# Patient Record
Sex: FEMALE | Race: OTHER | HISPANIC OR LATINO | URBAN - METROPOLITAN AREA
[De-identification: names, ages, dates, MRNs, and addresses within clinical notes are randomized per-mention and may not be internally consistent; named-entity substitution may affect disease eponyms.]

---

## 2017-03-13 ENCOUNTER — INPATIENT (INPATIENT)
Facility: HOSPITAL | Age: 77
LOS: 2 days | Discharge: HOME | DRG: 871 | End: 2017-03-16
Attending: INTERNAL MEDICINE | Admitting: INTERNAL MEDICINE
Payer: MEDICAID

## 2017-03-13 VITALS
OXYGEN SATURATION: 98 % | DIASTOLIC BLOOD PRESSURE: 47 MMHG | HEART RATE: 89 BPM | SYSTOLIC BLOOD PRESSURE: 67 MMHG | HEIGHT: 59 IN | WEIGHT: 100.09 LBS | RESPIRATION RATE: 16 BRPM | TEMPERATURE: 98 F

## 2017-03-13 DIAGNOSIS — R00.0 TACHYCARDIA, UNSPECIFIED: ICD-10-CM

## 2017-03-13 DIAGNOSIS — Z95.5 PRESENCE OF CORONARY ANGIOPLASTY IMPLANT AND GRAFT: Chronic | ICD-10-CM

## 2017-03-13 DIAGNOSIS — Z29.9 ENCOUNTER FOR PROPHYLACTIC MEASURES, UNSPECIFIED: ICD-10-CM

## 2017-03-13 DIAGNOSIS — A41.9 SEPSIS, UNSPECIFIED ORGANISM: ICD-10-CM

## 2017-03-13 LAB
ALBUMIN SERPL ELPH-MCNC: 3.6 G/DL — SIGNIFICANT CHANGE UP (ref 3.5–5)
ALP SERPL-CCNC: 110 U/L — SIGNIFICANT CHANGE UP (ref 40–120)
ALT FLD-CCNC: 22 U/L DA — SIGNIFICANT CHANGE UP (ref 10–60)
ANION GAP SERPL CALC-SCNC: 14 MMOL/L — SIGNIFICANT CHANGE UP (ref 5–17)
APPEARANCE UR: ABNORMAL
AST SERPL-CCNC: 30 U/L — SIGNIFICANT CHANGE UP (ref 10–40)
BASOPHILS # BLD AUTO: 0.1 K/UL — SIGNIFICANT CHANGE UP (ref 0–0.2)
BASOPHILS NFR BLD AUTO: 0.4 % — SIGNIFICANT CHANGE UP (ref 0–2)
BILIRUB SERPL-MCNC: 1.8 MG/DL — HIGH (ref 0.2–1.2)
BILIRUB UR-MCNC: NEGATIVE — SIGNIFICANT CHANGE UP
BUN SERPL-MCNC: 21 MG/DL — HIGH (ref 7–18)
CALCIUM SERPL-MCNC: 9.2 MG/DL — SIGNIFICANT CHANGE UP (ref 8.4–10.5)
CHLORIDE SERPL-SCNC: 102 MMOL/L — SIGNIFICANT CHANGE UP (ref 96–108)
CO2 SERPL-SCNC: 23 MMOL/L — SIGNIFICANT CHANGE UP (ref 22–31)
COLOR SPEC: YELLOW — SIGNIFICANT CHANGE UP
CREAT SERPL-MCNC: 1.39 MG/DL — HIGH (ref 0.5–1.3)
DIFF PNL FLD: ABNORMAL
EOSINOPHIL # BLD AUTO: 0 K/UL — SIGNIFICANT CHANGE UP (ref 0–0.5)
EOSINOPHIL NFR BLD AUTO: 0 % — SIGNIFICANT CHANGE UP (ref 0–6)
GLUCOSE SERPL-MCNC: 98 MG/DL — SIGNIFICANT CHANGE UP (ref 70–99)
GLUCOSE UR QL: NEGATIVE — SIGNIFICANT CHANGE UP
HCT VFR BLD CALC: 44.5 % — SIGNIFICANT CHANGE UP (ref 34.5–45)
HGB BLD-MCNC: 14.7 G/DL — SIGNIFICANT CHANGE UP (ref 11.5–15.5)
KETONES UR-MCNC: ABNORMAL
LACTATE SERPL-SCNC: 1.8 MMOL/L — SIGNIFICANT CHANGE UP (ref 0.7–2)
LEUKOCYTE ESTERASE UR-ACNC: ABNORMAL
LYMPHOCYTES # BLD AUTO: 0.9 K/UL — LOW (ref 1–3.3)
LYMPHOCYTES # BLD AUTO: 5.1 % — LOW (ref 13–44)
MCHC RBC-ENTMCNC: 31.1 PG — SIGNIFICANT CHANGE UP (ref 27–34)
MCHC RBC-ENTMCNC: 33.1 GM/DL — SIGNIFICANT CHANGE UP (ref 32–36)
MCV RBC AUTO: 94 FL — SIGNIFICANT CHANGE UP (ref 80–100)
MONOCYTES # BLD AUTO: 1.2 K/UL — HIGH (ref 0–0.9)
MONOCYTES NFR BLD AUTO: 6.9 % — SIGNIFICANT CHANGE UP (ref 2–14)
NEUTROPHILS # BLD AUTO: 15.2 K/UL — HIGH (ref 1.8–7.4)
NEUTROPHILS NFR BLD AUTO: 87.6 % — HIGH (ref 43–77)
NITRITE UR-MCNC: NEGATIVE — SIGNIFICANT CHANGE UP
PH UR: 7 — SIGNIFICANT CHANGE UP (ref 4.8–8)
PLATELET # BLD AUTO: 236 K/UL — SIGNIFICANT CHANGE UP (ref 150–400)
POTASSIUM SERPL-MCNC: 3.9 MMOL/L — SIGNIFICANT CHANGE UP (ref 3.5–5.3)
POTASSIUM SERPL-SCNC: 3.9 MMOL/L — SIGNIFICANT CHANGE UP (ref 3.5–5.3)
PROT SERPL-MCNC: 8.1 G/DL — SIGNIFICANT CHANGE UP (ref 6–8.3)
PROT UR-MCNC: 100
RBC # BLD: 4.73 M/UL — SIGNIFICANT CHANGE UP (ref 3.8–5.2)
RBC # FLD: 12 % — SIGNIFICANT CHANGE UP (ref 10.3–14.5)
SODIUM SERPL-SCNC: 139 MMOL/L — SIGNIFICANT CHANGE UP (ref 135–145)
SP GR SPEC: 1 — LOW (ref 1.01–1.02)
UROBILINOGEN FLD QL: NEGATIVE — SIGNIFICANT CHANGE UP
WBC # BLD: 17.4 K/UL — HIGH (ref 3.8–10.5)
WBC # FLD AUTO: 17.4 K/UL — HIGH (ref 3.8–10.5)

## 2017-03-13 PROCEDURE — 71010: CPT | Mod: 26

## 2017-03-13 PROCEDURE — 99285 EMERGENCY DEPT VISIT HI MDM: CPT

## 2017-03-13 RX ORDER — SODIUM CHLORIDE 9 MG/ML
1000 INJECTION INTRAMUSCULAR; INTRAVENOUS; SUBCUTANEOUS
Qty: 0 | Refills: 0 | Status: DISCONTINUED | OUTPATIENT
Start: 2017-03-13 | End: 2017-03-15

## 2017-03-13 RX ORDER — SODIUM CHLORIDE 9 MG/ML
3 INJECTION INTRAMUSCULAR; INTRAVENOUS; SUBCUTANEOUS ONCE
Qty: 0 | Refills: 0 | Status: COMPLETED | OUTPATIENT
Start: 2017-03-13 | End: 2017-03-13

## 2017-03-13 RX ORDER — ASPIRIN/CALCIUM CARB/MAGNESIUM 324 MG
81 TABLET ORAL DAILY
Qty: 0 | Refills: 0 | Status: DISCONTINUED | OUTPATIENT
Start: 2017-03-13 | End: 2017-03-16

## 2017-03-13 RX ORDER — SODIUM CHLORIDE 9 MG/ML
500 INJECTION INTRAMUSCULAR; INTRAVENOUS; SUBCUTANEOUS ONCE
Qty: 0 | Refills: 0 | Status: COMPLETED | OUTPATIENT
Start: 2017-03-13 | End: 2017-03-13

## 2017-03-13 RX ORDER — VANCOMYCIN HCL 1 G
1000 VIAL (EA) INTRAVENOUS ONCE
Qty: 0 | Refills: 0 | Status: COMPLETED | OUTPATIENT
Start: 2017-03-13 | End: 2017-03-13

## 2017-03-13 RX ORDER — DIPHENHYDRAMINE HCL 50 MG
25 CAPSULE ORAL ONCE
Qty: 0 | Refills: 0 | Status: COMPLETED | OUTPATIENT
Start: 2017-03-13 | End: 2017-03-13

## 2017-03-13 RX ORDER — ACETAMINOPHEN 500 MG
650 TABLET ORAL EVERY 6 HOURS
Qty: 0 | Refills: 0 | Status: DISCONTINUED | OUTPATIENT
Start: 2017-03-13 | End: 2017-03-16

## 2017-03-13 RX ORDER — AZITHROMYCIN 500 MG/1
500 TABLET, FILM COATED ORAL ONCE
Qty: 0 | Refills: 0 | Status: COMPLETED | OUTPATIENT
Start: 2017-03-13 | End: 2017-03-13

## 2017-03-13 RX ORDER — SODIUM CHLORIDE 9 MG/ML
1000 INJECTION INTRAMUSCULAR; INTRAVENOUS; SUBCUTANEOUS ONCE
Qty: 0 | Refills: 0 | Status: COMPLETED | OUTPATIENT
Start: 2017-03-13 | End: 2017-03-13

## 2017-03-13 RX ORDER — ENOXAPARIN SODIUM 100 MG/ML
30 INJECTION SUBCUTANEOUS DAILY
Qty: 0 | Refills: 0 | Status: DISCONTINUED | OUTPATIENT
Start: 2017-03-13 | End: 2017-03-16

## 2017-03-13 RX ORDER — SODIUM CHLORIDE 9 MG/ML
500 INJECTION INTRAMUSCULAR; INTRAVENOUS; SUBCUTANEOUS
Qty: 0 | Refills: 0 | Status: COMPLETED | OUTPATIENT
Start: 2017-03-13 | End: 2017-03-13

## 2017-03-13 RX ORDER — CEFTRIAXONE 500 MG/1
1 INJECTION, POWDER, FOR SOLUTION INTRAMUSCULAR; INTRAVENOUS EVERY 24 HOURS
Qty: 0 | Refills: 0 | Status: DISCONTINUED | OUTPATIENT
Start: 2017-03-13 | End: 2017-03-16

## 2017-03-13 RX ORDER — CEFEPIME 1 G/1
1000 INJECTION, POWDER, FOR SOLUTION INTRAMUSCULAR; INTRAVENOUS ONCE
Qty: 0 | Refills: 0 | Status: COMPLETED | OUTPATIENT
Start: 2017-03-13 | End: 2017-03-13

## 2017-03-13 RX ORDER — ENOXAPARIN SODIUM 100 MG/ML
40 INJECTION SUBCUTANEOUS DAILY
Qty: 0 | Refills: 0 | Status: DISCONTINUED | OUTPATIENT
Start: 2017-03-13 | End: 2017-03-13

## 2017-03-13 RX ORDER — AZITHROMYCIN 500 MG/1
TABLET, FILM COATED ORAL
Qty: 0 | Refills: 0 | Status: DISCONTINUED | OUTPATIENT
Start: 2017-03-13 | End: 2017-03-16

## 2017-03-13 RX ORDER — AZITHROMYCIN 500 MG/1
500 TABLET, FILM COATED ORAL EVERY 24 HOURS
Qty: 0 | Refills: 0 | Status: DISCONTINUED | OUTPATIENT
Start: 2017-03-14 | End: 2017-03-16

## 2017-03-13 RX ADMIN — SODIUM CHLORIDE 2000 MILLILITER(S): 9 INJECTION INTRAMUSCULAR; INTRAVENOUS; SUBCUTANEOUS at 14:36

## 2017-03-13 RX ADMIN — SODIUM CHLORIDE 2000 MILLILITER(S): 9 INJECTION INTRAMUSCULAR; INTRAVENOUS; SUBCUTANEOUS at 14:10

## 2017-03-13 RX ADMIN — SODIUM CHLORIDE 1000 MILLILITER(S): 9 INJECTION INTRAMUSCULAR; INTRAVENOUS; SUBCUTANEOUS at 20:07

## 2017-03-13 RX ADMIN — Medication 25 MILLIGRAM(S): at 15:02

## 2017-03-13 RX ADMIN — CEFTRIAXONE 100 GRAM(S): 500 INJECTION, POWDER, FOR SOLUTION INTRAMUSCULAR; INTRAVENOUS at 16:18

## 2017-03-13 RX ADMIN — Medication 250 MILLIGRAM(S): at 14:37

## 2017-03-13 RX ADMIN — SODIUM CHLORIDE 75 MILLILITER(S): 9 INJECTION INTRAMUSCULAR; INTRAVENOUS; SUBCUTANEOUS at 20:44

## 2017-03-13 RX ADMIN — SODIUM CHLORIDE 75 MILLILITER(S): 9 INJECTION INTRAMUSCULAR; INTRAVENOUS; SUBCUTANEOUS at 16:18

## 2017-03-13 RX ADMIN — CEFEPIME 100 MILLIGRAM(S): 1 INJECTION, POWDER, FOR SOLUTION INTRAMUSCULAR; INTRAVENOUS at 14:00

## 2017-03-13 RX ADMIN — AZITHROMYCIN 250 MILLIGRAM(S): 500 TABLET, FILM COATED ORAL at 16:36

## 2017-03-13 RX ADMIN — SODIUM CHLORIDE 3 MILLILITER(S): 9 INJECTION INTRAMUSCULAR; INTRAVENOUS; SUBCUTANEOUS at 13:24

## 2017-03-13 RX ADMIN — Medication 650 MILLIGRAM(S): at 17:40

## 2017-03-13 RX ADMIN — SODIUM CHLORIDE 2000 MILLILITER(S): 9 INJECTION INTRAMUSCULAR; INTRAVENOUS; SUBCUTANEOUS at 15:17

## 2017-03-13 RX ADMIN — SODIUM CHLORIDE 2000 MILLILITER(S): 9 INJECTION INTRAMUSCULAR; INTRAVENOUS; SUBCUTANEOUS at 14:46

## 2017-03-13 RX ADMIN — SODIUM CHLORIDE 1000 MILLILITER(S): 9 INJECTION INTRAMUSCULAR; INTRAVENOUS; SUBCUTANEOUS at 13:24

## 2017-03-13 NOTE — ED PROVIDER NOTE - CRITICAL CARE PROVIDED
direct patient care (not related to procedure)/additional history taking/interpretation of diagnostic studies/documentation/consult w/ pt's family directly relating to pts condition

## 2017-03-13 NOTE — ED PROVIDER NOTE - OBJECTIVE STATEMENT
Pt's daughter as . 77 y/o F pt with PMHx of Tachycardia and CAD and PSHx of cholecystectomy and Coronary Stents (x2) presents to ED c/o fever x5 days. Daughter states pt had tachycardia and hypotension this morning, although it was not measured; pt's heart function is generally low. Daughter also states pt has abd pain, urinary frequency, and urinary incontinence. Pt denies hematuria, or any other complaints. NKDA.

## 2017-03-13 NOTE — ED PROVIDER NOTE - NS ED MD SCRIBE ATTENDING SCRIBE SECTIONS
REVIEW OF SYSTEMS/HISTORY OF PRESENT ILLNESS/PHYSICAL EXAM/DISPOSITION/PAST MEDICAL/SURGICAL/SOCIAL HISTORY/VITAL SIGNS( Pullset)

## 2017-03-13 NOTE — H&P ADULT. - HISTORY OF PRESENT ILLNESS
76 F from home, lives with daughter PMH of tachycardia (on cardizem ) , lung nodules presented to ED with c/o fever, chills, and urine incontinence. As per patient for last one week she has been having increased urinary frequency and unable to hold urine , denied any burning  but c.o feeling pain in lower abdomen and sensation of full bladder with pain. She also c/o having fever associated with chills for the past 3 days  , did not measure temperature. She had one episode of UTI 6 months ago but did not require hospitalization. c/o bilateral back pain yesterday.   Denied any headache, chills, chest pain, SOB, diarrhea, c/o constipation, last BM yesterday . Denied any history of heart failure or CAD not on aspirin or statin.

## 2017-03-13 NOTE — ED PROVIDER NOTE - MEDICAL DECISION MAKING DETAILS
75 y/o F pt c/o of fever, hypotension, tachycardia, urinary frequency, urinary incontinence and abd pain x5 days. Code sepsis activated. Plan for fluids, labs, and submission for urosepsis.

## 2017-03-13 NOTE — H&P ADULT. - PROBLEM SELECTOR PLAN 2
history of sinus tachycardia on cardizem   Will hold as blood pressure is borderline   Restart once BP stable

## 2017-03-13 NOTE — H&P ADULT. - PROBLEM SELECTOR PLAN 1
s/p 2 L bolus s/p 2 L bolus  UA grossly positive   Sepsis secondary to UTI   s/p vancomycin and maxipime by ED   Continue with rocephin   CXR showed Numerous tiny bilateral nodular opacities are unchanged from the prior   radiographs dated 7/16/2015.Small new nodular opacities in the left midlung, may be infectious,   Patient coughing at bedside   Will add azithromycin for CAP coverage   Will get CT chest   No CVA tenderness on exam  , no need of CT abdomen for now

## 2017-03-13 NOTE — H&P ADULT. - ASSESSMENT
76 F from home, lives with daughter PMH of tachycardia (on cardizem ) , lung nodules presented to ED with c/o fever, chills, and urine incontinence. As per patient for last one week she has been having increased urinary frequency and unable to hold urine , denied any burning  but c.o feeling pain in lower abdomen and sensation of full bladder with pain. She also c/o having fever associated with chills for the past 3 days  , did not measure temperature. She had one episode of UTI 6 months ago but did not require hospitalization. c/o bilateral back pain yesterday but no CVA tenderness.   On admission BP 67/47 with HR of 89, repeated /61 , Lactate of 1.8 , WBC of 17.4 , received 2 L bolus .

## 2017-03-13 NOTE — ED ADULT NURSE NOTE - OBJECTIVE STATEMENT
1 pair
Patient came to the ED alert and responsive for low BP. Patient appears weak but not lethargic.

## 2017-03-13 NOTE — ED ADULT TRIAGE NOTE - CHIEF COMPLAINT QUOTE
per daughter, patient has been having frequent urination and generalized weakness for a week, hypotensive at triage

## 2017-03-13 NOTE — H&P ADULT. - ATTENDING COMMENTS
76 F from home, lives with daughter PMH of tachycardia (on cardizem ) , lung nodules presented to ED with c/o fever, chills, and urine incontinence. As per patient for last one week she has been having increased urinary frequency and unable to hold urine , denied any burning  but c.o feeling pain in lower abdomen and sensation of full bladder with pain. She also c/o having fever associated with chills for the past 3 days  , did not measure temperature. She had one episode of UTI 6 months ago but did not require hospitalization. c/o bilateral back pain yesterday.   Denied any headache, chills, chest pain, SOB, diarrhea, c/o constipation, last BM yesterday . Denied any history of heart failure or CAD not on aspirin or statin.    pt seen in oob in chair, NAD, vitals stable except for fever max temp 102 and hypotension in the ED, physical exam reveals lungs clear, heart s1s2, abd soft nt nd bs+, ext no edema. labs and diagnostic test result reviewed.    assessment -- sepsis likely 2nd to UTI, lung nodules, r/o pna r/o malignancy h/o tachycardia, lung nodules    plan  --  admit to medicine, start azithromax and rocephin, cont preadmit home meds, gi and dvt profilaxis,  cbc, bmp, mg, phos, lipids, tsh, bld cx, ua, ucx,    pulm cons

## 2017-03-14 LAB
AFP-TM SERPL-MCNC: 2.1 NG/ML — SIGNIFICANT CHANGE UP
ALBUMIN SERPL ELPH-MCNC: 2.6 G/DL — LOW (ref 3.5–5)
ALP SERPL-CCNC: 119 U/L — SIGNIFICANT CHANGE UP (ref 40–120)
ALT FLD-CCNC: 54 U/L DA — SIGNIFICANT CHANGE UP (ref 10–60)
ANION GAP SERPL CALC-SCNC: 13 MMOL/L — SIGNIFICANT CHANGE UP (ref 5–17)
AST SERPL-CCNC: 63 U/L — HIGH (ref 10–40)
BCA 255 TISS QL IMSTN: 15.5 U/ML — SIGNIFICANT CHANGE UP
BILIRUB SERPL-MCNC: 1 MG/DL — SIGNIFICANT CHANGE UP (ref 0.2–1.2)
BUN SERPL-MCNC: 13 MG/DL — SIGNIFICANT CHANGE UP (ref 7–18)
CALCIUM SERPL-MCNC: 8.1 MG/DL — LOW (ref 8.4–10.5)
CANCER AG125 SERPL-ACNC: 15 U/ML — SIGNIFICANT CHANGE UP
CEA SERPL-MCNC: 1 NG/ML — SIGNIFICANT CHANGE UP (ref 0–3.8)
CHLORIDE SERPL-SCNC: 108 MMOL/L — SIGNIFICANT CHANGE UP (ref 96–108)
CHOLEST SERPL-MCNC: 96 MG/DL — SIGNIFICANT CHANGE UP (ref 10–199)
CO2 SERPL-SCNC: 21 MMOL/L — LOW (ref 22–31)
CREAT SERPL-MCNC: 0.69 MG/DL — SIGNIFICANT CHANGE UP (ref 0.5–1.3)
GLUCOSE SERPL-MCNC: 73 MG/DL — SIGNIFICANT CHANGE UP (ref 70–99)
HBA1C BLD-MCNC: 5.5 % — SIGNIFICANT CHANGE UP (ref 4–5.6)
HCT VFR BLD CALC: 35.8 % — SIGNIFICANT CHANGE UP (ref 34.5–45)
HDLC SERPL-MCNC: 65 MG/DL — SIGNIFICANT CHANGE UP (ref 40–125)
HGB BLD-MCNC: 11.8 G/DL — SIGNIFICANT CHANGE UP (ref 11.5–15.5)
LACTATE SERPL-SCNC: 0.8 MMOL/L — SIGNIFICANT CHANGE UP (ref 0.7–2)
LIPID PNL WITH DIRECT LDL SERPL: 21 MG/DL — SIGNIFICANT CHANGE UP
MAGNESIUM SERPL-MCNC: 1.7 MG/DL — LOW (ref 1.8–2.4)
MCHC RBC-ENTMCNC: 30.8 PG — SIGNIFICANT CHANGE UP (ref 27–34)
MCHC RBC-ENTMCNC: 32.8 GM/DL — SIGNIFICANT CHANGE UP (ref 32–36)
MCV RBC AUTO: 93.8 FL — SIGNIFICANT CHANGE UP (ref 80–100)
PHOSPHATE SERPL-MCNC: 2.3 MG/DL — LOW (ref 2.5–4.5)
PLATELET # BLD AUTO: 184 K/UL — SIGNIFICANT CHANGE UP (ref 150–400)
POTASSIUM SERPL-MCNC: 3.5 MMOL/L — SIGNIFICANT CHANGE UP (ref 3.5–5.3)
POTASSIUM SERPL-SCNC: 3.5 MMOL/L — SIGNIFICANT CHANGE UP (ref 3.5–5.3)
PROT SERPL-MCNC: 6.3 G/DL — SIGNIFICANT CHANGE UP (ref 6–8.3)
RBC # BLD: 3.82 M/UL — SIGNIFICANT CHANGE UP (ref 3.8–5.2)
RBC # FLD: 12.3 % — SIGNIFICANT CHANGE UP (ref 10.3–14.5)
SODIUM SERPL-SCNC: 142 MMOL/L — SIGNIFICANT CHANGE UP (ref 135–145)
TOTAL CHOLESTEROL/HDL RATIO MEASUREMENT: 1.5 RATIO — LOW (ref 3.3–7.1)
TRIGL SERPL-MCNC: 50 MG/DL — SIGNIFICANT CHANGE UP (ref 10–149)
VIT B12 SERPL-MCNC: 555 PG/ML — SIGNIFICANT CHANGE UP (ref 243–894)
WBC # BLD: 13.3 K/UL — HIGH (ref 3.8–10.5)
WBC # FLD AUTO: 13.3 K/UL — HIGH (ref 3.8–10.5)

## 2017-03-14 PROCEDURE — 71250 CT THORAX DX C-: CPT | Mod: 26

## 2017-03-14 RX ADMIN — CEFTRIAXONE 100 GRAM(S): 500 INJECTION, POWDER, FOR SOLUTION INTRAMUSCULAR; INTRAVENOUS at 10:38

## 2017-03-14 RX ADMIN — Medication 650 MILLIGRAM(S): at 04:52

## 2017-03-14 RX ADMIN — Medication 81 MILLIGRAM(S): at 12:56

## 2017-03-14 RX ADMIN — AZITHROMYCIN 250 MILLIGRAM(S): 500 TABLET, FILM COATED ORAL at 10:38

## 2017-03-14 RX ADMIN — Medication 650 MILLIGRAM(S): at 20:41

## 2017-03-14 RX ADMIN — ENOXAPARIN SODIUM 30 MILLIGRAM(S): 100 INJECTION SUBCUTANEOUS at 12:56

## 2017-03-15 LAB
-  AMIKACIN: SIGNIFICANT CHANGE UP
-  AMPICILLIN/SULBACTAM: SIGNIFICANT CHANGE UP
-  AMPICILLIN: SIGNIFICANT CHANGE UP
-  AZTREONAM: SIGNIFICANT CHANGE UP
-  CEFAZOLIN: SIGNIFICANT CHANGE UP
-  CEFEPIME: SIGNIFICANT CHANGE UP
-  CEFOXITIN: SIGNIFICANT CHANGE UP
-  CEFTAZIDIME: SIGNIFICANT CHANGE UP
-  CEFTRIAXONE: SIGNIFICANT CHANGE UP
-  CIPROFLOXACIN: SIGNIFICANT CHANGE UP
-  ERTAPENEM: SIGNIFICANT CHANGE UP
-  GENTAMICIN: SIGNIFICANT CHANGE UP
-  IMIPENEM: SIGNIFICANT CHANGE UP
-  LEVOFLOXACIN: SIGNIFICANT CHANGE UP
-  MEROPENEM: SIGNIFICANT CHANGE UP
-  NITROFURANTOIN: SIGNIFICANT CHANGE UP
-  PIPERACILLIN/TAZOBACTAM: SIGNIFICANT CHANGE UP
-  TOBRAMYCIN: SIGNIFICANT CHANGE UP
-  TRIMETHOPRIM/SULFAMETHOXAZOLE: SIGNIFICANT CHANGE UP
ANION GAP SERPL CALC-SCNC: 12 MMOL/L — SIGNIFICANT CHANGE UP (ref 5–17)
BUN SERPL-MCNC: 8 MG/DL — SIGNIFICANT CHANGE UP (ref 7–18)
CALCIUM SERPL-MCNC: 8.8 MG/DL — SIGNIFICANT CHANGE UP (ref 8.4–10.5)
CANCER AG19-9 SERPL-ACNC: 27.9 U/ML — SIGNIFICANT CHANGE UP
CANCER AG27-29 SERPL-ACNC: 18.3 U/ML — SIGNIFICANT CHANGE UP (ref 0–37.7)
CHLORIDE SERPL-SCNC: 103 MMOL/L — SIGNIFICANT CHANGE UP (ref 96–108)
CO2 SERPL-SCNC: 26 MMOL/L — SIGNIFICANT CHANGE UP (ref 22–31)
CREAT SERPL-MCNC: 0.78 MG/DL — SIGNIFICANT CHANGE UP (ref 0.5–1.3)
CULTURE RESULTS: SIGNIFICANT CHANGE UP
GLUCOSE SERPL-MCNC: 89 MG/DL — SIGNIFICANT CHANGE UP (ref 70–99)
HCT VFR BLD CALC: 39.9 % — SIGNIFICANT CHANGE UP (ref 34.5–45)
HGB BLD-MCNC: 13 G/DL — SIGNIFICANT CHANGE UP (ref 11.5–15.5)
MCHC RBC-ENTMCNC: 29.9 PG — SIGNIFICANT CHANGE UP (ref 27–34)
MCHC RBC-ENTMCNC: 32.6 GM/DL — SIGNIFICANT CHANGE UP (ref 32–36)
MCV RBC AUTO: 91.7 FL — SIGNIFICANT CHANGE UP (ref 80–100)
METHOD TYPE: SIGNIFICANT CHANGE UP
ORGANISM # SPEC MICROSCOPIC CNT: SIGNIFICANT CHANGE UP
ORGANISM # SPEC MICROSCOPIC CNT: SIGNIFICANT CHANGE UP
PLATELET # BLD AUTO: 223 K/UL — SIGNIFICANT CHANGE UP (ref 150–400)
POTASSIUM SERPL-MCNC: 3.5 MMOL/L — SIGNIFICANT CHANGE UP (ref 3.5–5.3)
POTASSIUM SERPL-SCNC: 3.5 MMOL/L — SIGNIFICANT CHANGE UP (ref 3.5–5.3)
RBC # BLD: 4.35 M/UL — SIGNIFICANT CHANGE UP (ref 3.8–5.2)
RBC # FLD: 11.8 % — SIGNIFICANT CHANGE UP (ref 10.3–14.5)
SODIUM SERPL-SCNC: 141 MMOL/L — SIGNIFICANT CHANGE UP (ref 135–145)
SPECIMEN SOURCE: SIGNIFICANT CHANGE UP
WBC # BLD: 10.3 K/UL — SIGNIFICANT CHANGE UP (ref 3.8–10.5)
WBC # FLD AUTO: 10.3 K/UL — SIGNIFICANT CHANGE UP (ref 3.8–10.5)

## 2017-03-15 RX ADMIN — AZITHROMYCIN 250 MILLIGRAM(S): 500 TABLET, FILM COATED ORAL at 19:03

## 2017-03-15 RX ADMIN — Medication 81 MILLIGRAM(S): at 13:24

## 2017-03-15 RX ADMIN — CEFTRIAXONE 100 GRAM(S): 500 INJECTION, POWDER, FOR SOLUTION INTRAMUSCULAR; INTRAVENOUS at 11:53

## 2017-03-15 RX ADMIN — ENOXAPARIN SODIUM 30 MILLIGRAM(S): 100 INJECTION SUBCUTANEOUS at 11:53

## 2017-03-16 ENCOUNTER — TRANSCRIPTION ENCOUNTER (OUTPATIENT)
Age: 77
End: 2017-03-16

## 2017-03-16 VITALS
DIASTOLIC BLOOD PRESSURE: 68 MMHG | SYSTOLIC BLOOD PRESSURE: 100 MMHG | RESPIRATION RATE: 15 BRPM | HEART RATE: 72 BPM | OXYGEN SATURATION: 99 % | TEMPERATURE: 98 F

## 2017-03-16 RX ORDER — CIPROFLOXACIN LACTATE 400MG/40ML
1 VIAL (ML) INTRAVENOUS
Qty: 6 | Refills: 0
Start: 2017-03-16 | End: 2017-03-19

## 2017-03-16 RX ORDER — AZITHROMYCIN 500 MG/1
1 TABLET, FILM COATED ORAL
Qty: 1 | Refills: 0
Start: 2017-03-16 | End: 2017-03-17

## 2017-03-16 RX ADMIN — CEFTRIAXONE 100 GRAM(S): 500 INJECTION, POWDER, FOR SOLUTION INTRAMUSCULAR; INTRAVENOUS at 12:14

## 2017-03-16 RX ADMIN — Medication 81 MILLIGRAM(S): at 12:15

## 2017-03-16 RX ADMIN — ENOXAPARIN SODIUM 30 MILLIGRAM(S): 100 INJECTION SUBCUTANEOUS at 12:14

## 2017-03-16 RX ADMIN — AZITHROMYCIN 250 MILLIGRAM(S): 500 TABLET, FILM COATED ORAL at 17:52

## 2017-03-16 NOTE — DISCHARGE NOTE ADULT - CARE PROVIDER_API CALL
Regino Turner), Internal Medicine; Pulmonary Disease  [address] [place] [address]  Phone: [phone]  Fax: [phone]

## 2017-03-16 NOTE — DISCHARGE NOTE ADULT - MEDICATION SUMMARY - MEDICATIONS TO STOP TAKING
I will STOP taking the medications listed below when I get home from the hospital:    Diltiazem Hydrochloride  mg/24 hours oral capsule, extended release  -- 1 cap(s) by mouth once a day

## 2017-03-16 NOTE — DISCHARGE NOTE ADULT - MEDICATION SUMMARY - MEDICATIONS TO TAKE
I will START or STAY ON the medications listed below when I get home from the hospital:    Zithromax 500 mg oral tablet  -- 1 tab(s) by mouth once a day  -- Do not take dairy products, antacids, or iron preparations within one hour of this medication.  Finish all this medication unless otherwise directed by prescriber.    -- Indication: For CAP    Cipro 500 mg oral tablet  -- 1 tab(s) by mouth every 12 hours  -- Avoid prolonged or excessive exposure to direct and/or artificial sunlight while taking this medication.  Check with your doctor before becoming pregnant.  Do not take dairy products, antacids, or iron preparations within one hour of this medication.  Finish all this medication unless otherwise directed by prescriber.  Medication should be taken with plenty of water.    -- Indication: For Urinary tract infection without hematuria, site unspecified

## 2017-03-16 NOTE — DISCHARGE NOTE ADULT - HOSPITAL COURSE
75 y/o female from home, lives with daughter with PMH of tachycardia (on Cardizem ), lung nodules UTI 6 months ago, presented with fever, subjective fever, chills, and urinary incontinence with associated lower abdominal pain and sensation of bladder fullness..  Urinary frequency developed 1 week ago.  Denied having dysuria.      T 97.8 HR 89 RR 16 PO 98% RA BP 67/47.  WBC 17.4.  BUN/Cr 21/1.39 UA moderate leukocytes, blood, and bacteria.  CXR unchanged with numerous .tiny bilateral nodular opacity.  Small new nodular opacities in the left midlung, may be infectious.    Admitted to medicine    UTI  Ceftriaxone 1 gram IV Q24 hours then converted to Ciprofloxacin 500 mg BID until 3/19/17    CAP  Azithromycin 500 mg IV given daily for a total of 5 days.  CT of chest w/o contrast 12 mm peripherally calcified right thyroid nodule. Bronchietasis is identified within the right middle lobe and left upper lobe.  Scarlike opacities are identified within the b/l lung apices.  There are multiple calcified nodules identified within the RML and RLL measuring up to 7 mm and multiple calcified nodules are identified within right0 RUTH ANN measuring 5 mm.  There are multiple noncalcified nodular masses identified within the RUTH ANN and LLL measuring 6 mm. Very small b/l pleural effusion are identified, R>L.  Dr. Soni, pulmonologist consulted with consideration for septic emboli vs malignant metastatic disease.  Repeat CT in 2-4 weeks.  Compare PET CT if still present workup for malignancy recommended.    Preventive medicine  Outpatient mammogram & colonoscopy    ONI considered secondary to dehydration  NS 2L bolus given continued maintenance of 75 ml/hr

## 2017-03-16 NOTE — DISCHARGE NOTE ADULT - CARE PLAN
Principal Discharge DX:	Sepsis, due to unspecified organism  Goal:	Void of infection  Instructions for follow-up, activity and diet:	Continue fluid hydration  Regular diet  Office visit with PMD within 1 week  Secondary Diagnosis:	Urinary tract infection without hematuria, site unspecified  Goal:	Resolution  Instructions for follow-up, activity and diet:	Drink plenty of fluids

## 2017-03-16 NOTE — DISCHARGE NOTE ADULT - PLAN OF CARE
Void of infection Continue fluid hydration  Regular diet  Office visit with PMD within 1 week Resolution Drink plenty of fluids

## 2017-03-18 LAB
CULTURE RESULTS: SIGNIFICANT CHANGE UP
CULTURE RESULTS: SIGNIFICANT CHANGE UP
SPECIMEN SOURCE: SIGNIFICANT CHANGE UP
SPECIMEN SOURCE: SIGNIFICANT CHANGE UP

## 2017-03-21 DIAGNOSIS — J18.9 PNEUMONIA, UNSPECIFIED ORGANISM: ICD-10-CM

## 2017-03-21 DIAGNOSIS — R91.1 SOLITARY PULMONARY NODULE: ICD-10-CM

## 2017-03-21 DIAGNOSIS — I25.10 ATHEROSCLEROTIC HEART DISEASE OF NATIVE CORONARY ARTERY WITHOUT ANGINA PECTORIS: ICD-10-CM

## 2017-03-21 DIAGNOSIS — A41.9 SEPSIS, UNSPECIFIED ORGANISM: ICD-10-CM

## 2017-03-21 DIAGNOSIS — N39.0 URINARY TRACT INFECTION, SITE NOT SPECIFIED: ICD-10-CM

## 2017-03-21 DIAGNOSIS — B96.20 UNSPECIFIED ESCHERICHIA COLI [E. COLI] AS THE CAUSE OF DISEASES CLASSIFIED ELSEWHERE: ICD-10-CM

## 2017-03-21 DIAGNOSIS — I95.9 HYPOTENSION, UNSPECIFIED: ICD-10-CM

## 2017-03-21 DIAGNOSIS — Z28.21 IMMUNIZATION NOT CARRIED OUT BECAUSE OF PATIENT REFUSAL: ICD-10-CM

## 2017-03-21 DIAGNOSIS — R00.0 TACHYCARDIA, UNSPECIFIED: ICD-10-CM

## 2017-03-21 DIAGNOSIS — Z95.5 PRESENCE OF CORONARY ANGIOPLASTY IMPLANT AND GRAFT: ICD-10-CM

## 2017-03-21 DIAGNOSIS — E86.0 DEHYDRATION: ICD-10-CM

## 2017-03-21 DIAGNOSIS — N17.9 ACUTE KIDNEY FAILURE, UNSPECIFIED: ICD-10-CM

## 2017-11-03 ENCOUNTER — EMERGENCY (EMERGENCY)
Facility: HOSPITAL | Age: 77
LOS: 1 days | Discharge: HOME | End: 2017-11-03
Attending: EMERGENCY MEDICINE
Payer: SELF-PAY

## 2017-11-03 VITALS
OXYGEN SATURATION: 100 % | TEMPERATURE: 99 F | SYSTOLIC BLOOD PRESSURE: 122 MMHG | RESPIRATION RATE: 17 BRPM | DIASTOLIC BLOOD PRESSURE: 71 MMHG | HEART RATE: 79 BPM

## 2017-11-03 VITALS
WEIGHT: 102.07 LBS | HEART RATE: 69 BPM | HEIGHT: 62 IN | DIASTOLIC BLOOD PRESSURE: 68 MMHG | TEMPERATURE: 98 F | RESPIRATION RATE: 16 BRPM | OXYGEN SATURATION: 98 % | SYSTOLIC BLOOD PRESSURE: 124 MMHG

## 2017-11-03 DIAGNOSIS — Z95.5 PRESENCE OF CORONARY ANGIOPLASTY IMPLANT AND GRAFT: Chronic | ICD-10-CM

## 2017-11-03 LAB
ALBUMIN SERPL ELPH-MCNC: 3.7 G/DL — SIGNIFICANT CHANGE UP (ref 3.5–5)
ALP SERPL-CCNC: 125 U/L — HIGH (ref 40–120)
ALT FLD-CCNC: 27 U/L DA — SIGNIFICANT CHANGE UP (ref 10–60)
ANION GAP SERPL CALC-SCNC: 6 MMOL/L — SIGNIFICANT CHANGE UP (ref 5–17)
APPEARANCE UR: CLEAR — SIGNIFICANT CHANGE UP
AST SERPL-CCNC: 24 U/L — SIGNIFICANT CHANGE UP (ref 10–40)
BASOPHILS # BLD AUTO: 0 K/UL — SIGNIFICANT CHANGE UP (ref 0–0.2)
BASOPHILS NFR BLD AUTO: 0.6 % — SIGNIFICANT CHANGE UP (ref 0–2)
BILIRUB SERPL-MCNC: 0.9 MG/DL — SIGNIFICANT CHANGE UP (ref 0.2–1.2)
BILIRUB UR-MCNC: NEGATIVE — SIGNIFICANT CHANGE UP
BUN SERPL-MCNC: 23 MG/DL — HIGH (ref 7–18)
CALCIUM SERPL-MCNC: 9.3 MG/DL — SIGNIFICANT CHANGE UP (ref 8.4–10.5)
CHLORIDE SERPL-SCNC: 105 MMOL/L — SIGNIFICANT CHANGE UP (ref 96–108)
CO2 SERPL-SCNC: 28 MMOL/L — SIGNIFICANT CHANGE UP (ref 22–31)
COLOR SPEC: YELLOW — SIGNIFICANT CHANGE UP
CREAT SERPL-MCNC: 0.69 MG/DL — SIGNIFICANT CHANGE UP (ref 0.5–1.3)
DIFF PNL FLD: ABNORMAL
EOSINOPHIL # BLD AUTO: 0 K/UL — SIGNIFICANT CHANGE UP (ref 0–0.5)
EOSINOPHIL NFR BLD AUTO: 0.5 % — SIGNIFICANT CHANGE UP (ref 0–6)
GLUCOSE SERPL-MCNC: 93 MG/DL — SIGNIFICANT CHANGE UP (ref 70–99)
GLUCOSE UR QL: NEGATIVE — SIGNIFICANT CHANGE UP
HCT VFR BLD CALC: 44.6 % — SIGNIFICANT CHANGE UP (ref 34.5–45)
HGB BLD-MCNC: 14.2 G/DL — SIGNIFICANT CHANGE UP (ref 11.5–15.5)
KETONES UR-MCNC: ABNORMAL
LEUKOCYTE ESTERASE UR-ACNC: ABNORMAL
LYMPHOCYTES # BLD AUTO: 2 K/UL — SIGNIFICANT CHANGE UP (ref 1–3.3)
LYMPHOCYTES # BLD AUTO: 26.8 % — SIGNIFICANT CHANGE UP (ref 13–44)
MCHC RBC-ENTMCNC: 30.3 PG — SIGNIFICANT CHANGE UP (ref 27–34)
MCHC RBC-ENTMCNC: 31.7 GM/DL — LOW (ref 32–36)
MCV RBC AUTO: 95.5 FL — SIGNIFICANT CHANGE UP (ref 80–100)
MONOCYTES # BLD AUTO: 0.5 K/UL — SIGNIFICANT CHANGE UP (ref 0–0.9)
MONOCYTES NFR BLD AUTO: 6.7 % — SIGNIFICANT CHANGE UP (ref 2–14)
NEUTROPHILS # BLD AUTO: 4.8 K/UL — SIGNIFICANT CHANGE UP (ref 1.8–7.4)
NEUTROPHILS NFR BLD AUTO: 65.4 % — SIGNIFICANT CHANGE UP (ref 43–77)
NITRITE UR-MCNC: NEGATIVE — SIGNIFICANT CHANGE UP
PH UR: 5 — SIGNIFICANT CHANGE UP (ref 5–8)
PLATELET # BLD AUTO: 228 K/UL — SIGNIFICANT CHANGE UP (ref 150–400)
POTASSIUM SERPL-MCNC: 3.9 MMOL/L — SIGNIFICANT CHANGE UP (ref 3.5–5.3)
POTASSIUM SERPL-SCNC: 3.9 MMOL/L — SIGNIFICANT CHANGE UP (ref 3.5–5.3)
PROT SERPL-MCNC: 8 G/DL — SIGNIFICANT CHANGE UP (ref 6–8.3)
PROT UR-MCNC: NEGATIVE — SIGNIFICANT CHANGE UP
RBC # BLD: 4.67 M/UL — SIGNIFICANT CHANGE UP (ref 3.8–5.2)
RBC # FLD: 12.4 % — SIGNIFICANT CHANGE UP (ref 10.3–14.5)
SODIUM SERPL-SCNC: 139 MMOL/L — SIGNIFICANT CHANGE UP (ref 135–145)
SP GR SPEC: 1.02 — SIGNIFICANT CHANGE UP (ref 1.01–1.02)
UROBILINOGEN FLD QL: NEGATIVE — SIGNIFICANT CHANGE UP
WBC # BLD: 7.3 K/UL — SIGNIFICANT CHANGE UP (ref 3.8–10.5)
WBC # FLD AUTO: 7.3 K/UL — SIGNIFICANT CHANGE UP (ref 3.8–10.5)

## 2017-11-03 PROCEDURE — 87186 SC STD MICRODIL/AGAR DIL: CPT

## 2017-11-03 PROCEDURE — 85027 COMPLETE CBC AUTOMATED: CPT

## 2017-11-03 PROCEDURE — 87086 URINE CULTURE/COLONY COUNT: CPT

## 2017-11-03 PROCEDURE — 99283 EMERGENCY DEPT VISIT LOW MDM: CPT

## 2017-11-03 PROCEDURE — 81001 URINALYSIS AUTO W/SCOPE: CPT

## 2017-11-03 PROCEDURE — 99284 EMERGENCY DEPT VISIT MOD MDM: CPT

## 2017-11-03 PROCEDURE — 80053 COMPREHEN METABOLIC PANEL: CPT

## 2017-11-03 RX ORDER — CEPHALEXIN 500 MG
1 CAPSULE ORAL
Qty: 20 | Refills: 0
Start: 2017-11-03 | End: 2017-11-13

## 2017-11-03 RX ORDER — CEPHALEXIN 500 MG
500 CAPSULE ORAL ONCE
Qty: 0 | Refills: 0 | Status: COMPLETED | OUTPATIENT
Start: 2017-11-03 | End: 2017-11-03

## 2017-11-03 RX ADMIN — Medication 500 MILLIGRAM(S): at 22:42

## 2017-11-03 NOTE — ED PROVIDER NOTE - OBJECTIVE STATEMENT
75 y/o F pt w/ PMHx of HTN and tachycardia presents to ED s/p pt's doctor found pt had a urine infection.  Pt sent in for further eval.  Pt c/o abd pain, but denies nausea, vomiting, or any other complaints.  Pt is allergic to vancomycin and 1-Day.

## 2017-12-18 PROCEDURE — 82378 CARCINOEMBRYONIC ANTIGEN: CPT

## 2017-12-18 PROCEDURE — 83605 ASSAY OF LACTIC ACID: CPT

## 2017-12-18 PROCEDURE — 86301 IMMUNOASSAY TUMOR CA 19-9: CPT

## 2017-12-18 PROCEDURE — 82105 ALPHA-FETOPROTEIN SERUM: CPT

## 2017-12-18 PROCEDURE — 80048 BASIC METABOLIC PNL TOTAL CA: CPT

## 2017-12-18 PROCEDURE — 80053 COMPREHEN METABOLIC PANEL: CPT

## 2017-12-18 PROCEDURE — 96374 THER/PROPH/DIAG INJ IV PUSH: CPT

## 2017-12-18 PROCEDURE — 99285 EMERGENCY DEPT VISIT HI MDM: CPT | Mod: 25

## 2017-12-18 PROCEDURE — 81001 URINALYSIS AUTO W/SCOPE: CPT

## 2017-12-18 PROCEDURE — 87086 URINE CULTURE/COLONY COUNT: CPT

## 2017-12-18 PROCEDURE — 83036 HEMOGLOBIN GLYCOSYLATED A1C: CPT

## 2017-12-18 PROCEDURE — 80061 LIPID PANEL: CPT

## 2017-12-18 PROCEDURE — 96375 TX/PRO/DX INJ NEW DRUG ADDON: CPT

## 2017-12-18 PROCEDURE — 71250 CT THORAX DX C-: CPT

## 2017-12-18 PROCEDURE — 87186 SC STD MICRODIL/AGAR DIL: CPT

## 2017-12-18 PROCEDURE — 71045 X-RAY EXAM CHEST 1 VIEW: CPT

## 2017-12-18 PROCEDURE — 82607 VITAMIN B-12: CPT

## 2017-12-18 PROCEDURE — 83735 ASSAY OF MAGNESIUM: CPT

## 2017-12-18 PROCEDURE — 93005 ELECTROCARDIOGRAM TRACING: CPT

## 2017-12-18 PROCEDURE — 86304 IMMUNOASSAY TUMOR CA 125: CPT

## 2017-12-18 PROCEDURE — 84100 ASSAY OF PHOSPHORUS: CPT

## 2017-12-18 PROCEDURE — 85027 COMPLETE CBC AUTOMATED: CPT

## 2017-12-18 PROCEDURE — 87040 BLOOD CULTURE FOR BACTERIA: CPT

## 2017-12-18 PROCEDURE — 86300 IMMUNOASSAY TUMOR CA 15-3: CPT

## 2018-08-10 NOTE — ED ADULT NURSE NOTE - CAS DISCH BELONGINGS RETURNED
Problem: Anger Management/Homicidal Ideation:  Goal: Able to display appropriate communication and problem solving  Able to display appropriate communication and problem solving   Outcome: Ongoing  PSYCHOEDUCATION GROUP NOTE    Date: August 10, 2018  Start Time: 1330  End Time: 1430    Number Participants in Group:  5/22    Goal:  Patient will demonstrate increased interpersonal interaction   Topic: Leisure Skills/Cognitive Skills    Discipline Responsible:   OT  AT    Ns. x RT MHP Other       Participation Level:     None  Minimal   x Active Listener x Interactive    Monopolizing         Participation Quality:  x Appropriate  Inappropriate   x       Attentive        Intrusive   x       Sharing        Resistant          Supportive        Lethargic       Affective:   x Congruent  Incongruent  Blunted  Flat    Constricted  Anxious  Elated  Angry    Labile  Depressed  Other         Cognitive:  x Alert x Oriented PPTP     Concentration x G  F  P   Attention Span x G  F  P   Short-Term Memory x G  F  P   Long-Term Memory x G  F  P   ProblemSolving/  Decision Making x G  F  P   Ability to Process  Information x G  F  P      Contributing Factors             Delusional             Hallucinating             Flight of Ideas             Other:       Modes of Intervention:  x Education  Support  Exploration    Clarifying x Problem Solving  Confrontation   x Socialization  Limit Setting x Reality Testing   x Activity  Movement  Media    Other:            Response to Learning:   Able to verbalize current knowledge/experience    Able to verbalize/acknowledge new learning    Able to retain information    Capable of insight    Able to change behavior   x Progressing to goal    Other:        Comments: Not applicable

## 2019-03-05 NOTE — ED ADULT TRIAGE NOTE - HEIGHT IN INCHES
EXAM DESCRIPTION:  CT - Stone Protocol - 3/5/2019 2:27 pm

 

CLINICAL HISTORY:  Abdominal pain. Flank pain dysuria

 

COMPARISON:  None.

 

TECHNIQUE:  Computed axial tomography of the abdomen pelvis was obtained without oral or IV contrast.
 Lack of IV and oral contrast limits evaluation of solid organs, bowel, and vessels. Coronal reformat
pamella images were obtained and reviewed.

 

All CT scans are performed using dose optimization technique as appropriate and may include automated
 exposure control or mA/KV adjustment according to patient size.

 

FINDINGS:  A 2 millimeter nonobstructing right renal calculus. A left renal calculus is not seen. No 
hydronephrosis.  An ureteral calculus is not noted. A bladder calculus is not present.

 

The liver, spleen, pancreas and adrenals appear grossly normal

 

There is no evidence of diverticulitis. The appendix appears normal

 

Moderate amount of stool within the colon

 

IMPRESSION:  2 millimeter nonobstructing right renal calculus
2

## 2021-06-28 NOTE — DISCHARGE NOTE ADULT - CONDITIONS AT DISCHARGE
no lesions, no deformities, no traumatic injuries, no significant scars are present, chest wall non-tender, no masses present, tactile fremitus is normal, breathing is unlabored without accessory muscle use., normal breath sounds. Hemodynamically stable

## 2022-08-24 NOTE — PATIENT PROFILE ADULT. - SURGICAL SITE INCISION
Depo-Provera      [x]   Patient provided box Yes   o 3 Refills remain  o Refills submitted no   Last  Annual Date / Birth control check : 8/24/2022   Last Depo date: 6/02/2022   Side effects: No   HCG: No   Given by: Coy Griffin MA   Site: RD    o Calcium supplement daily teaching  o Condoms for 2 weeks following first injection dose  no

## 2023-04-20 NOTE — ED ADULT NURSE NOTE - NS ED NURSE LEVEL OF CONSCIOUSNESS ORIENTATION
Patient has been having episodes of dizziness followed by loss of consciousness of which he has been admitted to the hospital for last year. Patient experienced the same dizziness and loss of consciousness which took place this morning. Patient said he on blood pressure medications. Patient also reported loose stools x2. Oriented - self; Oriented - place; Oriented - time

## 2024-02-24 ENCOUNTER — INPATIENT (INPATIENT)
Facility: HOSPITAL | Age: 84
LOS: 3 days | Discharge: HOME | DRG: 193 | End: 2024-02-28
Attending: INTERNAL MEDICINE | Admitting: INTERNAL MEDICINE
Payer: MEDICAID

## 2024-02-24 VITALS
TEMPERATURE: 98 F | OXYGEN SATURATION: 96 % | HEART RATE: 65 BPM | DIASTOLIC BLOOD PRESSURE: 70 MMHG | SYSTOLIC BLOOD PRESSURE: 110 MMHG | HEIGHT: 62 IN | RESPIRATION RATE: 16 BRPM | WEIGHT: 87.08 LBS

## 2024-02-24 DIAGNOSIS — Z95.5 PRESENCE OF CORONARY ANGIOPLASTY IMPLANT AND GRAFT: Chronic | ICD-10-CM

## 2024-02-24 DIAGNOSIS — J18.9 PNEUMONIA, UNSPECIFIED ORGANISM: ICD-10-CM

## 2024-02-24 PROBLEM — I10 ESSENTIAL (PRIMARY) HYPERTENSION: Chronic | Status: ACTIVE | Noted: 2017-11-03

## 2024-02-24 PROBLEM — I25.10 ATHEROSCLEROTIC HEART DISEASE OF NATIVE CORONARY ARTERY WITHOUT ANGINA PECTORIS: Chronic | Status: ACTIVE | Noted: 2017-03-13

## 2024-02-24 LAB
ACETONE SERPL-MCNC: NEGATIVE — SIGNIFICANT CHANGE UP
ALBUMIN SERPL ELPH-MCNC: 3 G/DL — LOW (ref 3.5–5)
ALP SERPL-CCNC: 101 U/L — SIGNIFICANT CHANGE UP (ref 40–120)
ALT FLD-CCNC: 27 U/L DA — SIGNIFICANT CHANGE UP (ref 10–60)
ANION GAP SERPL CALC-SCNC: 3 MMOL/L — LOW (ref 5–17)
APPEARANCE UR: ABNORMAL
AST SERPL-CCNC: 18 U/L — SIGNIFICANT CHANGE UP (ref 10–40)
BACTERIA # UR AUTO: ABNORMAL /HPF
BASOPHILS # BLD AUTO: 0.03 K/UL — SIGNIFICANT CHANGE UP (ref 0–0.2)
BASOPHILS NFR BLD AUTO: 0.2 % — SIGNIFICANT CHANGE UP (ref 0–2)
BILIRUB SERPL-MCNC: 0.7 MG/DL — SIGNIFICANT CHANGE UP (ref 0.2–1.2)
BILIRUB UR-MCNC: ABNORMAL
BUN SERPL-MCNC: 20 MG/DL — HIGH (ref 7–18)
CALCIUM SERPL-MCNC: 9.7 MG/DL — SIGNIFICANT CHANGE UP (ref 8.4–10.5)
CHLORIDE SERPL-SCNC: 100 MMOL/L — SIGNIFICANT CHANGE UP (ref 96–108)
CK SERPL-CCNC: 30 U/L — SIGNIFICANT CHANGE UP (ref 21–215)
CO2 SERPL-SCNC: 31 MMOL/L — SIGNIFICANT CHANGE UP (ref 22–31)
COLOR SPEC: SIGNIFICANT CHANGE UP
COMMENT - URINE: SIGNIFICANT CHANGE UP
CREAT SERPL-MCNC: 0.97 MG/DL — SIGNIFICANT CHANGE UP (ref 0.5–1.3)
DIFF PNL FLD: ABNORMAL
EGFR: 58 ML/MIN/1.73M2 — LOW
EOSINOPHIL # BLD AUTO: 0 K/UL — SIGNIFICANT CHANGE UP (ref 0–0.5)
EOSINOPHIL NFR BLD AUTO: 0 % — SIGNIFICANT CHANGE UP (ref 0–6)
EPI CELLS # UR: PRESENT
GLUCOSE SERPL-MCNC: 155 MG/DL — HIGH (ref 70–99)
GLUCOSE UR QL: NEGATIVE MG/DL — SIGNIFICANT CHANGE UP
HCT VFR BLD CALC: 42.1 % — SIGNIFICANT CHANGE UP (ref 34.5–45)
HGB BLD-MCNC: 13.1 G/DL — SIGNIFICANT CHANGE UP (ref 11.5–15.5)
IMM GRANULOCYTES NFR BLD AUTO: 0.5 % — SIGNIFICANT CHANGE UP (ref 0–0.9)
KETONES UR-MCNC: ABNORMAL MG/DL
LACTATE SERPL-SCNC: 1.8 MMOL/L — SIGNIFICANT CHANGE UP (ref 0.7–2)
LACTATE SERPL-SCNC: 2.6 MMOL/L — HIGH (ref 0.7–2)
LEUKOCYTE ESTERASE UR-ACNC: ABNORMAL
LYMPHOCYTES # BLD AUTO: 1.8 K/UL — SIGNIFICANT CHANGE UP (ref 1–3.3)
LYMPHOCYTES # BLD AUTO: 13.9 % — SIGNIFICANT CHANGE UP (ref 13–44)
MCHC RBC-ENTMCNC: 29 PG — SIGNIFICANT CHANGE UP (ref 27–34)
MCHC RBC-ENTMCNC: 31.1 GM/DL — LOW (ref 32–36)
MCV RBC AUTO: 93.3 FL — SIGNIFICANT CHANGE UP (ref 80–100)
MONOCYTES # BLD AUTO: 0.91 K/UL — HIGH (ref 0–0.9)
MONOCYTES NFR BLD AUTO: 7 % — SIGNIFICANT CHANGE UP (ref 2–14)
NEUTROPHILS # BLD AUTO: 10.17 K/UL — HIGH (ref 1.8–7.4)
NEUTROPHILS NFR BLD AUTO: 78.4 % — HIGH (ref 43–77)
NITRITE UR-MCNC: POSITIVE
NRBC # BLD: 0 /100 WBCS — SIGNIFICANT CHANGE UP (ref 0–0)
NT-PROBNP SERPL-SCNC: 83 PG/ML — SIGNIFICANT CHANGE UP (ref 0–450)
OSMOLALITY SERPL: 300 MOSMOL/KG — SIGNIFICANT CHANGE UP (ref 280–301)
PH UR: 5.5 — SIGNIFICANT CHANGE UP (ref 5–8)
PLATELET # BLD AUTO: 501 K/UL — HIGH (ref 150–400)
POTASSIUM SERPL-MCNC: 3.6 MMOL/L — SIGNIFICANT CHANGE UP (ref 3.5–5.3)
POTASSIUM SERPL-SCNC: 3.6 MMOL/L — SIGNIFICANT CHANGE UP (ref 3.5–5.3)
PROT SERPL-MCNC: 8.5 G/DL — HIGH (ref 6–8.3)
PROT UR-MCNC: 30 MG/DL
RAPID RVP RESULT: SIGNIFICANT CHANGE UP
RBC # BLD: 4.51 M/UL — SIGNIFICANT CHANGE UP (ref 3.8–5.2)
RBC # FLD: 13.7 % — SIGNIFICANT CHANGE UP (ref 10.3–14.5)
RBC CASTS # UR COMP ASSIST: 20 /HPF — HIGH (ref 0–4)
SARS-COV-2 RNA SPEC QL NAA+PROBE: SIGNIFICANT CHANGE UP
SODIUM SERPL-SCNC: 134 MMOL/L — LOW (ref 135–145)
SP GR SPEC: 1.02 — SIGNIFICANT CHANGE UP (ref 1–1.03)
TROPONIN I, HIGH SENSITIVITY RESULT: 4.2 NG/L — SIGNIFICANT CHANGE UP
UROBILINOGEN FLD QL: 1 MG/DL — SIGNIFICANT CHANGE UP (ref 0.2–1)
WBC # BLD: 12.97 K/UL — HIGH (ref 3.8–10.5)
WBC # FLD AUTO: 12.97 K/UL — HIGH (ref 3.8–10.5)
WBC UR QL: >50 /HPF — HIGH (ref 0–5)

## 2024-02-24 PROCEDURE — 71045 X-RAY EXAM CHEST 1 VIEW: CPT | Mod: 26

## 2024-02-24 PROCEDURE — 99291 CRITICAL CARE FIRST HOUR: CPT

## 2024-02-24 RX ORDER — LANOLIN ALCOHOL/MO/W.PET/CERES
3 CREAM (GRAM) TOPICAL AT BEDTIME
Refills: 0 | Status: DISCONTINUED | OUTPATIENT
Start: 2024-02-24 | End: 2024-02-28

## 2024-02-24 RX ORDER — ACETAMINOPHEN 500 MG
650 TABLET ORAL EVERY 6 HOURS
Refills: 0 | Status: DISCONTINUED | OUTPATIENT
Start: 2024-02-24 | End: 2024-02-28

## 2024-02-24 RX ORDER — SODIUM CHLORIDE 9 MG/ML
1000 INJECTION INTRAMUSCULAR; INTRAVENOUS; SUBCUTANEOUS
Refills: 0 | Status: DISCONTINUED | OUTPATIENT
Start: 2024-02-24 | End: 2024-02-27

## 2024-02-24 RX ORDER — SODIUM CHLORIDE 9 MG/ML
1000 INJECTION INTRAMUSCULAR; INTRAVENOUS; SUBCUTANEOUS ONCE
Refills: 0 | Status: COMPLETED | OUTPATIENT
Start: 2024-02-24 | End: 2024-02-24

## 2024-02-24 RX ORDER — ONDANSETRON 8 MG/1
4 TABLET, FILM COATED ORAL EVERY 8 HOURS
Refills: 0 | Status: DISCONTINUED | OUTPATIENT
Start: 2024-02-24 | End: 2024-02-28

## 2024-02-24 RX ORDER — SODIUM CHLORIDE 9 MG/ML
200 INJECTION INTRAMUSCULAR; INTRAVENOUS; SUBCUTANEOUS ONCE
Refills: 0 | Status: COMPLETED | OUTPATIENT
Start: 2024-02-24 | End: 2024-02-24

## 2024-02-24 RX ORDER — AZITHROMYCIN 500 MG/1
500 TABLET, FILM COATED ORAL ONCE
Refills: 0 | Status: COMPLETED | OUTPATIENT
Start: 2024-02-24 | End: 2024-02-24

## 2024-02-24 RX ORDER — CEFTRIAXONE 500 MG/1
1000 INJECTION, POWDER, FOR SOLUTION INTRAMUSCULAR; INTRAVENOUS ONCE
Refills: 0 | Status: COMPLETED | OUTPATIENT
Start: 2024-02-24 | End: 2024-02-24

## 2024-02-24 RX ADMIN — CEFTRIAXONE 100 MILLIGRAM(S): 500 INJECTION, POWDER, FOR SOLUTION INTRAMUSCULAR; INTRAVENOUS at 14:03

## 2024-02-24 RX ADMIN — SODIUM CHLORIDE 400 MILLILITER(S): 9 INJECTION INTRAMUSCULAR; INTRAVENOUS; SUBCUTANEOUS at 15:25

## 2024-02-24 RX ADMIN — SODIUM CHLORIDE 1000 MILLILITER(S): 9 INJECTION INTRAMUSCULAR; INTRAVENOUS; SUBCUTANEOUS at 14:04

## 2024-02-24 RX ADMIN — SODIUM CHLORIDE 125 MILLILITER(S): 9 INJECTION INTRAMUSCULAR; INTRAVENOUS; SUBCUTANEOUS at 15:05

## 2024-02-24 RX ADMIN — AZITHROMYCIN 255 MILLIGRAM(S): 500 TABLET, FILM COATED ORAL at 14:46

## 2024-02-24 NOTE — ED ADULT NURSE NOTE - NSFALLHARMRISKINTERV_ED_ALL_ED

## 2024-02-24 NOTE — ED PROVIDER NOTE - CLINICAL SUMMARY MEDICAL DECISION MAKING FREE TEXT BOX
Patient with diagnosis of flu 3 to 4 weeks ago, coughing continues for a few weeks, with no appetite and weakness.  Rhonchi noted on physical exam to right lower lobe, concern for pneumonia superimposed on viral infection, dehydration, electrolyte imbalance.  Will get labs, CXR, give IV antibiotics, IV fluid, possible admission

## 2024-02-24 NOTE — ED PROVIDER NOTE - PROGRESS NOTE DETAILS
Labs/CXR explained to patient and daughter  Patient with leukocytosis and elevated lactate acid level, chest x-ray possible RML infiltrate, also possible UTI, will admit patient  Case discussed with Dr. Mina

## 2024-02-24 NOTE — PATIENT PROFILE ADULT - FALL HARM RISK - HARM RISK INTERVENTIONS

## 2024-02-24 NOTE — ED PROVIDER NOTE - NS_EDPROVIDERDISPOUSERTYPE_ED_A_ED
Problem: Adult Inpatient Plan of Care  Goal: Plan of Care Review  Outcome: Ongoing, Progressing     Problem: Adult Inpatient Plan of Care  Goal: Patient-Specific Goal (Individualized)  Outcome: Ongoing, Progressing     Problem: Adult Inpatient Plan of Care  Goal: Absence of Hospital-Acquired Illness or Injury  Outcome: Ongoing, Progressing     Problem: Adult Inpatient Plan of Care  Goal: Optimal Comfort and Wellbeing  Outcome: Ongoing, Progressing      Attending Attestation (For Attendings USE Only)...

## 2024-02-24 NOTE — ED PROVIDER NOTE - CROS ED GI ALL NEG
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
negative...

## 2024-02-24 NOTE — ED ADULT NURSE NOTE - OBJECTIVE STATEMENT
decrease in  appetite x 3 weeks since getting the flu decrease in  appetite x 1 week since getting the flu 3 weeks ago

## 2024-02-24 NOTE — PATIENT PROFILE ADULT - OVER THE PAST TWO WEEKS, HAVE YOU FELT LITTLE INTEREST OR PLEASURE IN DOING THINGS?
Detail Level: Zone Photo Preface (Leave Blank If You Do Not Want): Photographs were obtained today no

## 2024-02-24 NOTE — PATIENT PROFILE ADULT - FUNCTIONAL ASSESSMENT - BASIC MOBILITY 6.
70
 4-calculated by average/Not able to assess (calculate score using [facility] averaging method)

## 2024-02-24 NOTE — ED PROVIDER NOTE - OBJECTIVE STATEMENT
83-year-old female with history of HTN, CAD, s/p stents placement.  As per daughter Gabo, patient was diagnosed with flu 3 to 4 weeks ago and was treated with Tamiflu.  Patient continued to cough, coughed up whitish to yellowish sputum, FERREIRA, no appetite, weakness.  She denies leg edema, CP, palpitation, wheezing, SOB, fever, chills, N/V, vomiting

## 2024-02-25 DIAGNOSIS — I10 ESSENTIAL (PRIMARY) HYPERTENSION: ICD-10-CM

## 2024-02-25 DIAGNOSIS — J18.9 PNEUMONIA, UNSPECIFIED ORGANISM: ICD-10-CM

## 2024-02-25 DIAGNOSIS — J47.9 BRONCHIECTASIS, UNCOMPLICATED: ICD-10-CM

## 2024-02-25 DIAGNOSIS — I25.10 ATHEROSCLEROTIC HEART DISEASE OF NATIVE CORONARY ARTERY WITHOUT ANGINA PECTORIS: ICD-10-CM

## 2024-02-25 DIAGNOSIS — N39.0 URINARY TRACT INFECTION, SITE NOT SPECIFIED: ICD-10-CM

## 2024-02-25 DIAGNOSIS — Z29.9 ENCOUNTER FOR PROPHYLACTIC MEASURES, UNSPECIFIED: ICD-10-CM

## 2024-02-25 LAB
ANION GAP SERPL CALC-SCNC: 5 MMOL/L — SIGNIFICANT CHANGE UP (ref 5–17)
BUN SERPL-MCNC: 8 MG/DL — SIGNIFICANT CHANGE UP (ref 7–18)
CALCIUM SERPL-MCNC: 9.3 MG/DL — SIGNIFICANT CHANGE UP (ref 8.4–10.5)
CHLORIDE SERPL-SCNC: 103 MMOL/L — SIGNIFICANT CHANGE UP (ref 96–108)
CO2 SERPL-SCNC: 26 MMOL/L — SIGNIFICANT CHANGE UP (ref 22–31)
CREAT SERPL-MCNC: 0.55 MG/DL — SIGNIFICANT CHANGE UP (ref 0.5–1.3)
EGFR: 91 ML/MIN/1.73M2 — SIGNIFICANT CHANGE UP
GLUCOSE BLDC GLUCOMTR-MCNC: 106 MG/DL — HIGH (ref 70–99)
GLUCOSE BLDC GLUCOMTR-MCNC: 174 MG/DL — HIGH (ref 70–99)
GLUCOSE BLDC GLUCOMTR-MCNC: 83 MG/DL — SIGNIFICANT CHANGE UP (ref 70–99)
GLUCOSE BLDC GLUCOMTR-MCNC: 95 MG/DL — SIGNIFICANT CHANGE UP (ref 70–99)
GLUCOSE SERPL-MCNC: 162 MG/DL — HIGH (ref 70–99)
GRAM STN FLD: ABNORMAL
HCT VFR BLD CALC: 33.4 % — LOW (ref 34.5–45)
HGB BLD-MCNC: 10.6 G/DL — LOW (ref 11.5–15.5)
MAGNESIUM SERPL-MCNC: 1.9 MG/DL — SIGNIFICANT CHANGE UP (ref 1.6–2.6)
MCHC RBC-ENTMCNC: 28.6 PG — SIGNIFICANT CHANGE UP (ref 27–34)
MCHC RBC-ENTMCNC: 31.7 GM/DL — LOW (ref 32–36)
MCV RBC AUTO: 90.3 FL — SIGNIFICANT CHANGE UP (ref 80–100)
NRBC # BLD: 0 /100 WBCS — SIGNIFICANT CHANGE UP (ref 0–0)
PHOSPHATE SERPL-MCNC: 2.8 MG/DL — SIGNIFICANT CHANGE UP (ref 2.5–4.5)
PLATELET # BLD AUTO: 382 K/UL — SIGNIFICANT CHANGE UP (ref 150–400)
POTASSIUM SERPL-MCNC: 3.4 MMOL/L — LOW (ref 3.5–5.3)
POTASSIUM SERPL-SCNC: 3.4 MMOL/L — LOW (ref 3.5–5.3)
RBC # BLD: 3.7 M/UL — LOW (ref 3.8–5.2)
RBC # FLD: 13.7 % — SIGNIFICANT CHANGE UP (ref 10.3–14.5)
S PNEUM AG UR QL: NEGATIVE — SIGNIFICANT CHANGE UP
SODIUM SERPL-SCNC: 134 MMOL/L — LOW (ref 135–145)
SPECIMEN SOURCE: SIGNIFICANT CHANGE UP
WBC # BLD: 8.34 K/UL — SIGNIFICANT CHANGE UP (ref 3.8–10.5)
WBC # FLD AUTO: 8.34 K/UL — SIGNIFICANT CHANGE UP (ref 3.8–10.5)

## 2024-02-25 RX ORDER — ATORVASTATIN CALCIUM 80 MG/1
40 TABLET, FILM COATED ORAL AT BEDTIME
Refills: 0 | Status: DISCONTINUED | OUTPATIENT
Start: 2024-02-25 | End: 2024-02-28

## 2024-02-25 RX ORDER — CEFTRIAXONE 500 MG/1
1000 INJECTION, POWDER, FOR SOLUTION INTRAMUSCULAR; INTRAVENOUS EVERY 24 HOURS
Refills: 0 | Status: DISCONTINUED | OUTPATIENT
Start: 2024-02-25 | End: 2024-02-28

## 2024-02-25 RX ORDER — ENOXAPARIN SODIUM 100 MG/ML
30 INJECTION SUBCUTANEOUS EVERY 24 HOURS
Refills: 0 | Status: DISCONTINUED | OUTPATIENT
Start: 2024-02-25 | End: 2024-02-28

## 2024-02-25 RX ORDER — AZITHROMYCIN 500 MG/1
500 TABLET, FILM COATED ORAL EVERY 24 HOURS
Refills: 0 | Status: DISCONTINUED | OUTPATIENT
Start: 2024-02-25 | End: 2024-02-28

## 2024-02-25 RX ORDER — ASPIRIN/CALCIUM CARB/MAGNESIUM 324 MG
81 TABLET ORAL DAILY
Refills: 0 | Status: DISCONTINUED | OUTPATIENT
Start: 2024-02-25 | End: 2024-02-28

## 2024-02-25 RX ADMIN — Medication 81 MILLIGRAM(S): at 12:58

## 2024-02-25 RX ADMIN — ENOXAPARIN SODIUM 30 MILLIGRAM(S): 100 INJECTION SUBCUTANEOUS at 05:32

## 2024-02-25 RX ADMIN — CEFTRIAXONE 100 MILLIGRAM(S): 500 INJECTION, POWDER, FOR SOLUTION INTRAMUSCULAR; INTRAVENOUS at 05:31

## 2024-02-25 RX ADMIN — ATORVASTATIN CALCIUM 40 MILLIGRAM(S): 80 TABLET, FILM COATED ORAL at 21:39

## 2024-02-25 RX ADMIN — AZITHROMYCIN 255 MILLIGRAM(S): 500 TABLET, FILM COATED ORAL at 05:31

## 2024-02-25 RX ADMIN — SODIUM CHLORIDE 125 MILLILITER(S): 9 INJECTION INTRAMUSCULAR; INTRAVENOUS; SUBCUTANEOUS at 12:58

## 2024-02-25 NOTE — H&P ADULT - ATTENDING COMMENTS
83F PMH HTN and CAD s/p stents presenting to the ED with SOB and cough. Pt seen at bedside, states that she has been having a productive cough with sputum for the past month, initially with yellowish sputum, now more white in color. She has dyspnea on exertion and reports generalized weakness. She lives at home with her daughter and ambulates independently. As per ED chart, daughter was at bedside earlier and reports that she was diagnosed with influenza 1 month ago and was treated with tamiflu. Pt denies any fevers, chills, CP,N/V/D, abdominal pain, dysuria numbness/tingling/weakness in extremities.   pt seen in bed, vitals stable except for , physical exam reveals lungs        , heart s1s2, abd soft nd nt bs+, ext no edema. labs and diagnostic test result reviewed.    assessment  --  pneumonia, uti, h/o  HTN and CAD s/p stents    plan  --  admit to med, rocephin and dilan prettybs, supplemental oxygen prn, cont preadmit home meds, gi and dvt prophylaxis,   cbc, bmp, mg, phos, lipids, tsh, bld and ucx,     pulm cons

## 2024-02-25 NOTE — H&P ADULT - ASSESSMENT
83F PMH HTN and CAD s/p stents presenting to the ED with SOB and cough admitted for PNA and UTI    PRIMARY TEAM TO PLEASE CONFIRM MED REC IN AM

## 2024-02-25 NOTE — H&P ADULT - NSHPPHYSICALEXAM_GEN_ALL_CORE
General - NAD, lying in bed, appears comfortable, elderly, thin  Eyes - PERRLA, EOM intact  ENT - Nonicteric sclerae, PERRLA, EOMI. Oropharynx clear. Moist mucous membranes. Conjunctivae appear well perfused.   Neck - No noticeable or palpable swelling, redness or rash around throat or on face  Lymph Nodes - No lymphadenopathy  Cardiovascular - RRR no m/r/g, no JVD, no carotid bruits  Lungs - (+) mild rhonchi heard in RML field.  No use of accessory muscles, no crackles or wheezes.  Skin - No rashes, skin warm and dry, no erythematous areas  Abdomen - Normal bowel sounds, abdomen soft and nontender  Rectal – Rectal exam not performed since no symptoms indicated blood loss.  Extremities - No edema, cyanosis or clubbing  Musculoskeletal - 5/5 strength, normal range of motion, no swollen or erythematous joints.  Neuro– Alert and oriented x 3, CN 2-12 grossly intact.

## 2024-02-25 NOTE — H&P ADULT - PROBLEM SELECTOR PLAN 3
h/o HTN   unknown home meds  /70 on admission   holding BP meds for now, primary team to please confirm med rec in AM  monitor BP

## 2024-02-25 NOTE — H&P ADULT - PROBLEM SELECTOR PLAN 2
UA (+) on admission, no Sx of dysuria  WBC 12.97, lactate 2.6 > 1.8  will start on rocephin  f/u urine cultures

## 2024-02-25 NOTE — H&P ADULT - PROBLEM SELECTOR PLAN 1
p/w productive cough and FERREIRA  CXR wet read ?RML infiltrates  WBC 12.97  lactate 2.6 > 1.8  RVP negative  will start rocephin and azithro  f/u urine legionella, mycoplasma and strep

## 2024-02-25 NOTE — CONSULT NOTE ADULT - SUBJECTIVE AND OBJECTIVE BOX
PULMONARY CONSULT NOTE      EWELINA LOMELI  MRN-[id number]    History of Present Illness:  Reason for Admission: PNA, acute UTI  History of Present Illness:   83F PMH HTN and CAD s/p stents presenting to the ED with SOB and cough. Pt seen at bedside, states that she has been having a productive cough with sputum for the past month, initially with yellowish sputum, now more white in color. She has dyspnea on exertion and reports generalized weakness. She lives at home with her daughter and ambulates independently. As per ED chart, daughter was at bedside earlier and reports that she was diagnosed with influenza 1 month ago and was treated with tamiflu. Pt denies any fevers, chills, CP,N/V/D, abdominal pain, dysuria numbness/tingling/weakness in extremities.         HISTORY OF PRESENT ILLNESS: As Above. Awake, alert, comfortable in bed in NAD    MEDICATIONS  (STANDING):  aspirin  chewable 81 milliGRAM(s) Oral daily  atorvastatin 40 milliGRAM(s) Oral at bedtime  azithromycin  IVPB 500 milliGRAM(s) IV Intermittent every 24 hours  cefTRIAXone   IVPB 1000 milliGRAM(s) IV Intermittent every 24 hours  enoxaparin Injectable 30 milliGRAM(s) SubCutaneous every 24 hours  sodium chloride 0.9%. 1000 milliLiter(s) (125 mL/Hr) IV Continuous <Continuous>      MEDICATIONS  (PRN):  acetaminophen     Tablet .. 650 milliGRAM(s) Oral every 6 hours PRN Temp greater or equal to 38C (100.4F), Mild Pain (1 - 3)  aluminum hydroxide/magnesium hydroxide/simethicone Suspension 30 milliLiter(s) Oral every 4 hours PRN Dyspepsia  melatonin 3 milliGRAM(s) Oral at bedtime PRN Insomnia  ondansetron Injectable 4 milliGRAM(s) IV Push every 8 hours PRN Nausea and/or Vomiting      Allergies    1-Day (Pruritus; Flushing; Headache)  vancomycin (Flushing; Pruritus)    Intolerances        PAST MEDICAL & SURGICAL HISTORY:  Tachycardia      CAD (coronary artery disease)      HTN (hypertension)      S/P cholecystectomy      Stented coronary artery          FAMILY HISTORY:      SOCIAL HISTORY  Smoking History:     REVIEW OF SYSTEMS:    CONSTITUTIONAL:  No fevers, chills, sweats    HEENT:  Eyes:  No diplopia or blurred vision. ENT:  No earache, sore throat or runny nose.    CARDIOVASCULAR:  No pressure, squeezing, tightness, or heaviness about the chest; no palpitations.    RESPIRATORY:  Per HPI    GASTROINTESTINAL:  No abdominal pain, nausea, vomiting or diarrhea.    GENITOURINARY:  No dysuria, frequency or urgency.    NEUROLOGIC:  No paresthesias, fasciculations, seizures or weakness.    PSYCHIATRIC:  No disorder of thought or mood.    Vital Signs Last 24 Hrs  T(C): 36.9 (25 Feb 2024 05:00), Max: 36.9 (24 Feb 2024 12:26)  T(F): 98.4 (25 Feb 2024 05:00), Max: 98.5 (24 Feb 2024 20:18)  HR: 82 (25 Feb 2024 05:00) (65 - 86)  BP: 101/58 (25 Feb 2024 05:00) (101/58 - 126/69)  BP(mean): 80 (24 Feb 2024 20:18) (80 - 80)  RR: 18 (25 Feb 2024 05:00) (16 - 18)  SpO2: 93% (25 Feb 2024 05:00) (93% - 96%)    Parameters below as of 25 Feb 2024 05:00  Patient On (Oxygen Delivery Method): room air      I&O's Detail      PHYSICAL EXAMINATION:    GENERAL: The patient is a well-developed, well-nourished _____in no apparent distress.     HEENT: Head is normocephalic and atraumatic. Extraocular muscles are intact. Mucous membranes are moist.     NECK: Supple.     LUNGS:+Rales on right base    HEART: Regular rate and rhythm without murmur.    ABDOMEN: Soft, nontender, and nondistended.  No hepatosplenomegaly is noted.    EXTREMITIES: Without any cyanosis, clubbing, rash, lesions or edema.    NEUROLOGIC: Grossly intact.      LABS:                        10.6   8.34  )-----------( 382      ( 25 Feb 2024 07:21 )             33.4     02-25    134<L>  |  103  |  8   ----------------------------<  162<H>  3.4<L>   |  26  |  0.55    Ca    9.3      25 Feb 2024 07:21  Phos  2.8     02-25  Mg     1.9     02-25    TPro  8.5<H>  /  Alb  3.0<L>  /  TBili  0.7  /  DBili  x   /  AST  18  /  ALT  27  /  AlkPhos  101  02-24      Urinalysis Basic - ( 25 Feb 2024 07:21 )    Color: x / Appearance: x / SG: x / pH: x  Gluc: 162 mg/dL / Ketone: x  / Bili: x / Urobili: x   Blood: x / Protein: x / Nitrite: x   Leuk Esterase: x / RBC: x / WBC x   Sq Epi: x / Non Sq Epi: x / Bacteria: x        CARDIAC MARKERS ( 24 Feb 2024 13:30 )  x     / x     / 30 U/L / x     / x              Lactate, Blood: 1.8 mmol/L (02-24-24 @ 15:50)  Lactate, Blood: 2.6 mmol/L (02-24-24 @ 13:30)        MICROBIOLOGY:    RADIOLOGY & ADDITIONAL STUDIES:    CXR:  < from: Xray Chest 1 View-PORTABLE IMMEDIATE (02.24.24 @ 14:06) >  IMPRESSION: Diffuse nodular interstitial markings have progressed with   areas of mucus impaction/bronchiectasis and calcified granulomata.    Please note patient had CT chest and 2017. Recommend follow-up CT chest   comparison.    < end of copied text >    Ct scan chest;    ekg;    echo:

## 2024-02-25 NOTE — H&P ADULT - HISTORY OF PRESENT ILLNESS
83F PMH HTN and CAD s/p stents presenting to the ED with SOB and cough. Pt seen at bedside, states that she has been having a productive cough with sputum for the past month, initially with yellowish sputum, now more white in color. She has dyspnea on exertion and reports generalized weakness. She lives at home with her daughter and ambulates independently. As per ED chart, daughter was at bedside earlier and reports that she was diagnosed with influenza 1 month ago and was treated with tamiflu. Pt denies any fevers, chills, CP,N/V/D, abdominal pain, dysuria numbness/tingling/weakness in extremities.

## 2024-02-25 NOTE — H&P ADULT - NSHPREVIEWOFSYSTEMS_GEN_ALL_CORE
CONSTITUTIONAL: (+) generalized weakness. No fever, weight loss, or fatigue  RESPIRATORY: (+) productive cough with yellow sputum, FERREIRA. No  wheezing, chills or hemoptysis;   CARDIOVASCULAR: No chest pain, palpitations, dizziness, or leg swelling  GASTROINTESTINAL: No abdominal pain. No nausea, vomiting, or hematemesis; No diarrhea or constipation. No melena or hematochezia.  GENITOURINARY: No dysuria or hematuria, urinary frequency  NEUROLOGICAL: No headaches, memory loss, loss of strength, numbness, or tremors  ENDOCRINE: No polyuria, polydipsia, or heat/cold intolerance  MUSKULOSKELETAL: No muscle aches, joint pains  HEME: no easy bruisability, no tender or enlarged lymph nodes  SKIN: No itching, burning, rashes, or lesions .

## 2024-02-26 DIAGNOSIS — Z02.9 ENCOUNTER FOR ADMINISTRATIVE EXAMINATIONS, UNSPECIFIED: ICD-10-CM

## 2024-02-26 LAB
ANION GAP SERPL CALC-SCNC: 5 MMOL/L — SIGNIFICANT CHANGE UP (ref 5–17)
BUN SERPL-MCNC: 8 MG/DL — SIGNIFICANT CHANGE UP (ref 7–18)
CALCIUM SERPL-MCNC: 8.8 MG/DL — SIGNIFICANT CHANGE UP (ref 8.4–10.5)
CHLORIDE SERPL-SCNC: 103 MMOL/L — SIGNIFICANT CHANGE UP (ref 96–108)
CO2 SERPL-SCNC: 28 MMOL/L — SIGNIFICANT CHANGE UP (ref 22–31)
CREAT SERPL-MCNC: 0.61 MG/DL — SIGNIFICANT CHANGE UP (ref 0.5–1.3)
EGFR: 89 ML/MIN/1.73M2 — SIGNIFICANT CHANGE UP
GLUCOSE BLDC GLUCOMTR-MCNC: 77 MG/DL — SIGNIFICANT CHANGE UP (ref 70–99)
GLUCOSE BLDC GLUCOMTR-MCNC: 83 MG/DL — SIGNIFICANT CHANGE UP (ref 70–99)
GLUCOSE BLDC GLUCOMTR-MCNC: 87 MG/DL — SIGNIFICANT CHANGE UP (ref 70–99)
GLUCOSE SERPL-MCNC: 103 MG/DL — HIGH (ref 70–99)
HCT VFR BLD CALC: 34.7 % — SIGNIFICANT CHANGE UP (ref 34.5–45)
HGB BLD-MCNC: 10.9 G/DL — LOW (ref 11.5–15.5)
LEGIONELLA AG UR QL: NEGATIVE — SIGNIFICANT CHANGE UP
MAGNESIUM SERPL-MCNC: 1.9 MG/DL — SIGNIFICANT CHANGE UP (ref 1.6–2.6)
MCHC RBC-ENTMCNC: 28.9 PG — SIGNIFICANT CHANGE UP (ref 27–34)
MCHC RBC-ENTMCNC: 31.4 GM/DL — LOW (ref 32–36)
MCV RBC AUTO: 92 FL — SIGNIFICANT CHANGE UP (ref 80–100)
NRBC # BLD: 0 /100 WBCS — SIGNIFICANT CHANGE UP (ref 0–0)
PHOSPHATE SERPL-MCNC: 3.2 MG/DL — SIGNIFICANT CHANGE UP (ref 2.5–4.5)
PLATELET # BLD AUTO: 398 K/UL — SIGNIFICANT CHANGE UP (ref 150–400)
POTASSIUM SERPL-MCNC: 3.5 MMOL/L — SIGNIFICANT CHANGE UP (ref 3.5–5.3)
POTASSIUM SERPL-SCNC: 3.5 MMOL/L — SIGNIFICANT CHANGE UP (ref 3.5–5.3)
RBC # BLD: 3.77 M/UL — LOW (ref 3.8–5.2)
RBC # FLD: 13.4 % — SIGNIFICANT CHANGE UP (ref 10.3–14.5)
SODIUM SERPL-SCNC: 136 MMOL/L — SIGNIFICANT CHANGE UP (ref 135–145)
WBC # BLD: 8.18 K/UL — SIGNIFICANT CHANGE UP (ref 3.8–10.5)
WBC # FLD AUTO: 8.18 K/UL — SIGNIFICANT CHANGE UP (ref 3.8–10.5)

## 2024-02-26 PROCEDURE — 71250 CT THORAX DX C-: CPT | Mod: 26

## 2024-02-26 RX ADMIN — CEFTRIAXONE 100 MILLIGRAM(S): 500 INJECTION, POWDER, FOR SOLUTION INTRAMUSCULAR; INTRAVENOUS at 05:40

## 2024-02-26 RX ADMIN — Medication 81 MILLIGRAM(S): at 11:44

## 2024-02-26 RX ADMIN — ATORVASTATIN CALCIUM 40 MILLIGRAM(S): 80 TABLET, FILM COATED ORAL at 21:12

## 2024-02-26 RX ADMIN — ENOXAPARIN SODIUM 30 MILLIGRAM(S): 100 INJECTION SUBCUTANEOUS at 05:41

## 2024-02-26 RX ADMIN — SODIUM CHLORIDE 125 MILLILITER(S): 9 INJECTION INTRAMUSCULAR; INTRAVENOUS; SUBCUTANEOUS at 22:19

## 2024-02-26 RX ADMIN — AZITHROMYCIN 255 MILLIGRAM(S): 500 TABLET, FILM COATED ORAL at 05:40

## 2024-02-26 NOTE — DIETITIAN INITIAL EVALUATION ADULT - OTHER INFO
Pt lives home with family PTA, alert, oriented, [place] speaking, contacted via [employer]  ID # [id number], well-communicated; Reported decreased intake since Nov~Dec, 2023 due to flu, wt loss from 94 lb to 87 lb, but gradually losing wt prior to flu (usual vj=698 to 105 lb) x ? duration with unclear reason, denied GI distress, chewing or swallowing problem; Unknown food allergy, no specific food choices, eating better, but varied intake depending on food, willing to try oral nutritional supplement

## 2024-02-26 NOTE — PROGRESS NOTE ADULT - SUBJECTIVE AND OBJECTIVE BOX
Patient is a 83y old  Female who presents with a chief complaint of PNA, acute UTI (25 Feb 2024 10:06)    pt seen in icu [  ], reg med floor [   ], bed [  ], chair at bedside [   ], a+o x3 [  ], lethargic [  ],  nad [  ]    robertson [  ], ngt [  ], peg [  ], et tube [  ], cent line [  ], picc line [  ]        Allergies    1-Day (Pruritus; Flushing; Headache)  vancomycin (Flushing; Pruritus)        Vitals    T(F): 97.7 (02-26-24 @ 05:36), Max: 99.5 (02-25-24 @ 12:40)  HR: 76 (02-26-24 @ 05:36) (76 - 86)  BP: 108/67 (02-26-24 @ 05:36) (100/59 - 117/61)  RR: 20 (02-26-24 @ 05:36) (18 - 20)  SpO2: 92% (02-26-24 @ 05:36) (92% - 97%)  Wt(kg): --  CAPILLARY BLOOD GLUCOSE      POCT Blood Glucose.: 95 mg/dL (25 Feb 2024 21:14)      Labs                          10.6   8.34  )-----------( 382      ( 25 Feb 2024 07:21 )             33.4       02-25    134<L>  |  103  |  8   ----------------------------<  162<H>  3.4<L>   |  26  |  0.55    Ca    9.3      25 Feb 2024 07:21  Phos  2.8     02-25  Mg     1.9     02-25    TPro  8.5<H>  /  Alb  3.0<L>  /  TBili  0.7  /  DBili  x   /  AST  18  /  ALT  27  /  AlkPhos  101  02-24      CARDIAC MARKERS ( 24 Feb 2024 13:30 )  x     / x     / 30 U/L / x     / x          Troponin I, High Sensitivity Result: 4.2 ng/L (02-24-24 @ 13:30)    .Sputum Sputum  02-25 @ 05:23 --  --    Few polymorphonuclear leukocytes per low power field  Few Squamous epithelial cells per low power field  Few Gram Negative Rods seen per oil power field  Few Gram Positive Rods seen per oil power field      .Blood Blood  02-24 @ 13:30   No growth at 24 hours  --  --          Radiology Results      Meds    MEDICATIONS  (STANDING):  aspirin  chewable 81 milliGRAM(s) Oral daily  atorvastatin 40 milliGRAM(s) Oral at bedtime  azithromycin  IVPB 500 milliGRAM(s) IV Intermittent every 24 hours  cefTRIAXone   IVPB 1000 milliGRAM(s) IV Intermittent every 24 hours  enoxaparin Injectable 30 milliGRAM(s) SubCutaneous every 24 hours  sodium chloride 0.9%. 1000 milliLiter(s) (125 mL/Hr) IV Continuous <Continuous>      MEDICATIONS  (PRN):  acetaminophen     Tablet .. 650 milliGRAM(s) Oral every 6 hours PRN Temp greater or equal to 38C (100.4F), Mild Pain (1 - 3)  aluminum hydroxide/magnesium hydroxide/simethicone Suspension 30 milliLiter(s) Oral every 4 hours PRN Dyspepsia  melatonin 3 milliGRAM(s) Oral at bedtime PRN Insomnia  ondansetron Injectable 4 milliGRAM(s) IV Push every 8 hours PRN Nausea and/or Vomiting      Physical Exam    Neuro :  no focal deficits  Respiratory: CTA B/L  CV: RRR, S1S2, no murmurs,   Abdominal: Soft, NT, ND +BS,  Extremities: No edema, + peripheral pulses    ASSESSMENT    pneumonia,   uti,   h/o  HTN and CAD s/p stents      Pneumonia due to infectious organism    Tachycardia    CAD (coronary artery disease)    HTN (hypertension)    S/P cholecystectomy    Stented coronary artery        PLAN    admit to med   rocephin and eyssica pretty,   supplemental oxygen prn,   cont preadmit home meds,   gi and dvt prophylaxis,   cbc,   bmp,   mg,  phos,   lipids,   tsh,   bld and ucx,     pulm cons .     Patient is a 83y old  Female who presents with a chief complaint of PNA, acute UTI (25 Feb 2024 10:06)    pt seen in icu [  ], reg med floor [ x  ], bed [ x ], chair at bedside [   ], a+o x3 [x  ], lethargic [  ],  nad [ x ]      Allergies    1-Day (Pruritus; Flushing; Headache)  vancomycin (Flushing; Pruritus)        Vitals    T(F): 97.7 (02-26-24 @ 05:36), Max: 99.5 (02-25-24 @ 12:40)  HR: 76 (02-26-24 @ 05:36) (76 - 86)  BP: 108/67 (02-26-24 @ 05:36) (100/59 - 117/61)  RR: 20 (02-26-24 @ 05:36) (18 - 20)  SpO2: 92% (02-26-24 @ 05:36) (92% - 97%)  Wt(kg): --  CAPILLARY BLOOD GLUCOSE      POCT Blood Glucose.: 95 mg/dL (25 Feb 2024 21:14)      Labs                          10.6   8.34  )-----------( 382      ( 25 Feb 2024 07:21 )             33.4       02-25    134<L>  |  103  |  8   ----------------------------<  162<H>  3.4<L>   |  26  |  0.55    Ca    9.3      25 Feb 2024 07:21  Phos  2.8     02-25  Mg     1.9     02-25    TPro  8.5<H>  /  Alb  3.0<L>  /  TBili  0.7  /  DBili  x   /  AST  18  /  ALT  27  /  AlkPhos  101  02-24      CARDIAC MARKERS ( 24 Feb 2024 13:30 )  x     / x     / 30 U/L / x     / x          Troponin I, High Sensitivity Result: 4.2 ng/L (02-24-24 @ 13:30)    .Sputum Sputum  02-25 @ 05:23 --  --    Few polymorphonuclear leukocytes per low power field  Few Squamous epithelial cells per low power field  Few Gram Negative Rods seen per oil power field  Few Gram Positive Rods seen per oil power field      .Blood Blood  02-24 @ 13:30   No growth at 24 hours  --  --          Radiology Results      Meds    MEDICATIONS  (STANDING):  aspirin  chewable 81 milliGRAM(s) Oral daily  atorvastatin 40 milliGRAM(s) Oral at bedtime  azithromycin  IVPB 500 milliGRAM(s) IV Intermittent every 24 hours  cefTRIAXone   IVPB 1000 milliGRAM(s) IV Intermittent every 24 hours  enoxaparin Injectable 30 milliGRAM(s) SubCutaneous every 24 hours  sodium chloride 0.9%. 1000 milliLiter(s) (125 mL/Hr) IV Continuous <Continuous>      MEDICATIONS  (PRN):  acetaminophen     Tablet .. 650 milliGRAM(s) Oral every 6 hours PRN Temp greater or equal to 38C (100.4F), Mild Pain (1 - 3)  aluminum hydroxide/magnesium hydroxide/simethicone Suspension 30 milliLiter(s) Oral every 4 hours PRN Dyspepsia  melatonin 3 milliGRAM(s) Oral at bedtime PRN Insomnia  ondansetron Injectable 4 milliGRAM(s) IV Push every 8 hours PRN Nausea and/or Vomiting      Physical Exam    Neuro :  no focal deficits  Respiratory: CTA B/L  CV: RRR, S1S2, no murmurs,   Abdominal: Soft, NT, ND +BS,  Extremities: No edema, + peripheral pulses    ASSESSMENT    pneumonia,   uti,   h/o  HTN   CAD s/p stents  S/P cholecystectomy        PLAN    rocephin and maria de jesus,   blood cx neg  f/u ucx   duonebs,   supplemental oxygen prn,   pulm f/u   spiriva  pfts as OP  f/u CT chest with no contrast.  cont current meds

## 2024-02-26 NOTE — DIETITIAN INITIAL EVALUATION ADULT - PERTINENT MEDS FT
MEDICATIONS  (STANDING):  aspirin  chewable 81 milliGRAM(s) Oral daily  atorvastatin 40 milliGRAM(s) Oral at bedtime  azithromycin  IVPB 500 milliGRAM(s) IV Intermittent every 24 hours  cefTRIAXone   IVPB 1000 milliGRAM(s) IV Intermittent every 24 hours  enoxaparin Injectable 30 milliGRAM(s) SubCutaneous every 24 hours  sodium chloride 0.9%. 1000 milliLiter(s) (125 mL/Hr) IV Continuous <Continuous>    MEDICATIONS  (PRN):  acetaminophen     Tablet .. 650 milliGRAM(s) Oral every 6 hours PRN Temp greater or equal to 38C (100.4F), Mild Pain (1 - 3)  aluminum hydroxide/magnesium hydroxide/simethicone Suspension 30 milliLiter(s) Oral every 4 hours PRN Dyspepsia  melatonin 3 milliGRAM(s) Oral at bedtime PRN Insomnia  ondansetron Injectable 4 milliGRAM(s) IV Push every 8 hours PRN Nausea and/or Vomiting

## 2024-02-26 NOTE — DIETITIAN NUTRITION RISK NOTIFICATION - ADDITIONAL COMMENTS/DIETITIAN RECOMMENDATIONS
Malnutrition Diagnosis Parameters: intake<75% x 3m, wt loss 7.4% x 3-4m or 16% x ?duration, severe muscle/fat loss, BMI=15.9

## 2024-02-26 NOTE — PROGRESS NOTE ADULT - SUBJECTIVE AND OBJECTIVE BOX
Patient is a 83y old  Female who presents with a chief complaint of Pneumonia due to infectious organism  Awake, alert, comfortable in bed in NAD. Feeling better   (26 Feb 2024 10:01)      INTERVAL HPI/OVERNIGHT EVENTS:      VITAL SIGNS:  T(F): 97.7 (02-26-24 @ 05:36)  HR: 76 (02-26-24 @ 05:36)  BP: 108/67 (02-26-24 @ 05:36)  RR: 20 (02-26-24 @ 05:36)  SpO2: 92% (02-26-24 @ 05:36)  Wt(kg): --  I&O's Detail          REVIEW OF SYSTEMS:    CONSTITUTIONAL:  No fevers, chills, sweats    HEENT:  Eyes:  No diplopia or blurred vision. ENT:  No earache, sore throat or runny nose.    CARDIOVASCULAR:  No pressure, squeezing, tightness, or heaviness about the chest; no palpitations.    RESPIRATORY:  Per HPI    GASTROINTESTINAL:  No abdominal pain, nausea, vomiting or diarrhea.    GENITOURINARY:  No dysuria, frequency or urgency.    NEUROLOGIC:  No paresthesias, fasciculations, seizures or weakness.    PSYCHIATRIC:  No disorder of thought or mood.      PHYSICAL EXAM:    Constitutional: Well developed and nourished  Eyes:Perrla  ENMT: normal  Neck:supple  Respiratory: good air entry  Cardiovascular: S1 S2 regular  Gastrointestinal: Soft, Non tender  Extremities: No edema  Vascular:normal  Neurological:Awake, alert,Ox3  Musculoskeletal:Normal      MEDICATIONS  (STANDING):  aspirin  chewable 81 milliGRAM(s) Oral daily  atorvastatin 40 milliGRAM(s) Oral at bedtime  azithromycin  IVPB 500 milliGRAM(s) IV Intermittent every 24 hours  cefTRIAXone   IVPB 1000 milliGRAM(s) IV Intermittent every 24 hours  enoxaparin Injectable 30 milliGRAM(s) SubCutaneous every 24 hours  sodium chloride 0.9%. 1000 milliLiter(s) (125 mL/Hr) IV Continuous <Continuous>    MEDICATIONS  (PRN):  acetaminophen     Tablet .. 650 milliGRAM(s) Oral every 6 hours PRN Temp greater or equal to 38C (100.4F), Mild Pain (1 - 3)  aluminum hydroxide/magnesium hydroxide/simethicone Suspension 30 milliLiter(s) Oral every 4 hours PRN Dyspepsia  melatonin 3 milliGRAM(s) Oral at bedtime PRN Insomnia  ondansetron Injectable 4 milliGRAM(s) IV Push every 8 hours PRN Nausea and/or Vomiting      Allergies    1-Day (Pruritus; Flushing; Headache)  vancomycin (Flushing; Pruritus)    Intolerances        LABS:                        10.9   8.18  )-----------( 398      ( 26 Feb 2024 06:19 )             34.7     02-26    136  |  103  |  8   ----------------------------<  103<H>  3.5   |  28  |  0.61    Ca    8.8      26 Feb 2024 06:19  Phos  3.2     02-26  Mg     1.9     02-26    TPro  8.5<H>  /  Alb  3.0<L>  /  TBili  0.7  /  DBili  x   /  AST  18  /  ALT  27  /  AlkPhos  101  02-24      Urinalysis Basic - ( 26 Feb 2024 06:19 )    Color: x / Appearance: x / SG: x / pH: x  Gluc: 103 mg/dL / Ketone: x  / Bili: x / Urobili: x   Blood: x / Protein: x / Nitrite: x   Leuk Esterase: x / RBC: x / WBC x   Sq Epi: x / Non Sq Epi: x / Bacteria: x        CARDIAC MARKERS ( 24 Feb 2024 13:30 )  x     / x     / 30 U/L / x     / x          CAPILLARY BLOOD GLUCOSE      POCT Blood Glucose.: 87 mg/dL (26 Feb 2024 08:03)  POCT Blood Glucose.: 95 mg/dL (25 Feb 2024 21:14)  POCT Blood Glucose.: 106 mg/dL (25 Feb 2024 17:16)  POCT Blood Glucose.: 174 mg/dL (25 Feb 2024 11:27)        RADIOLOGY & ADDITIONAL TESTS:    CXR:    < from: Xray Chest 1 View-PORTABLE IMMEDIATE (02.24.24 @ 14:06) >  IMPRESSION: Diffuse nodular interstitial markings have progressed with   areas of mucus impaction/bronchiectasis and calcified granulomata.    Please note patient had CT chest and 2017. Recommend follow-up CT chest   comparison.    --- End of Report ---    < end of copied text >  Ct scan chest:    ekg;    echo:

## 2024-02-26 NOTE — DIETITIAN INITIAL EVALUATION ADULT - PERTINENT LABORATORY DATA
02-26    136  |  103  |  8   ----------------------------<  103<H>  3.5   |  28  |  0.61    Ca    8.8      26 Feb 2024 06:19  Phos  3.2     02-26  Mg     1.9     02-26    TPro  8.5<H>  /  Alb  3.0<L>  /  TBili  0.7  /  DBili  x   /  AST  18  /  ALT  27  /  AlkPhos  101  02-24  POCT Blood Glucose.: 87 mg/dL (02-26-24 @ 08:03)

## 2024-02-26 NOTE — DIETITIAN INITIAL EVALUATION ADULT - DIET TYPE
Ensure Compact 1 can ( 4 oz or 120 ml ) x bid daily ( 440 kcal, 18 g protein) to start, may adjust as medically feasible/DASH/TLC (sodium and cholesterol restricted diet)/regular

## 2024-02-26 NOTE — ED PROVIDER NOTE - CROS ED GI ALL NEG
----- Message from Courtney White sent at 2/26/2024  9:47 AM CST -----  Contact: Katarzyna Flores is calling to speak with Trudy in this office concerning a prescription. Please give call back [phone]     - - -

## 2024-02-26 NOTE — DIETITIAN INITIAL EVALUATION ADULT - FACTORS AFF FOOD INTAKE
acute on chronic comorbidities/persistent lack of appetite/Episcopalian/ethnic/cultural/personal food preferences

## 2024-02-26 NOTE — PROGRESS NOTE ADULT - SUBJECTIVE AND OBJECTIVE BOX
Patient is a 83y old  Female who presents with a chief complaint of PNA, acute UTI (26 Feb 2024 10:41)      INTERVAL HPI/OVERNIGHT EVENTS: pt seen at bedside with daughter Lourdes present and use of  Jess # [id number]. She had no new complaints    MEDICATIONS  (STANDING):  aspirin  chewable 81 milliGRAM(s) Oral daily  atorvastatin 40 milliGRAM(s) Oral at bedtime  azithromycin  IVPB 500 milliGRAM(s) IV Intermittent every 24 hours  cefTRIAXone   IVPB 1000 milliGRAM(s) IV Intermittent every 24 hours  enoxaparin Injectable 30 milliGRAM(s) SubCutaneous every 24 hours  sodium chloride 0.9%. 1000 milliLiter(s) (125 mL/Hr) IV Continuous <Continuous>    MEDICATIONS  (PRN):  acetaminophen     Tablet .. 650 milliGRAM(s) Oral every 6 hours PRN Temp greater or equal to 38C (100.4F), Mild Pain (1 - 3)  aluminum hydroxide/magnesium hydroxide/simethicone Suspension 30 milliLiter(s) Oral every 4 hours PRN Dyspepsia  melatonin 3 milliGRAM(s) Oral at bedtime PRN Insomnia  ondansetron Injectable 4 milliGRAM(s) IV Push every 8 hours PRN Nausea and/or Vomiting    __________________________________________________  REVIEW OF SYSTEMS:    CONSTITUTIONAL: No fever,   EYES: no acute visual disturbances  NECK: No pain or stiffness  RESPIRATORY: No cough; No shortness of breath  CARDIOVASCULAR: No chest pain, no palpitations  GASTROINTESTINAL: No pain. No nausea or vomiting; No diarrhea   NEUROLOGICAL: No headache or numbness, no tremors  MUSCULOSKELETAL: No joint pain, no muscle pain  GENITOURINARY: no dysuria, no frequency, no hesitancy  PSYCHIATRY: no depression , no anxiety  ALL OTHER  ROS negative      Vital Signs Last 24 Hrs  T(C): 36.2 (26 Feb 2024 12:56), Max: 36.7 (25 Feb 2024 22:00)  T(F): 97.2 (26 Feb 2024 12:56), Max: 98 (25 Feb 2024 22:00)  HR: 86 (26 Feb 2024 12:56) (76 - 86)  BP: 111/72 (26 Feb 2024 12:56) (108/67 - 117/61)  BP(mean): --  RR: 16 (26 Feb 2024 12:56) (16 - 20)  SpO2: 95% (26 Feb 2024 12:56) (92% - 95%)    Parameters below as of 26 Feb 2024 12:56  Patient On (Oxygen Delivery Method): room air    ________________________________________________  PHYSICAL EXAM:  GENERAL: NAD  HEENT: Normocephalic;  conjunctivae and sclerae clear; moist mucous membranes;   NECK : supple  CHEST/LUNG: Clear to auscultation bilaterally with good air entry   HEART: S1 S2  regular; no murmurs, gallops or rubs  ABDOMEN: Soft, Nontender, Nondistended; Bowel sounds present  EXTREMITIES: no cyanosis; no edema; no calf tenderness  SKIN: warm and dry; no rash  NERVOUS SYSTEM:  Awake and alert; Oriented  to place, person and time ; no new deficits    _________________________________________________  LABS:                        10.9   8.18  )-----------( 398      ( 26 Feb 2024 06:19 )             34.7     02-26    136  |  103  |  8   ----------------------------<  103<H>  3.5   |  28  |  0.61    Ca    8.8      26 Feb 2024 06:19  Phos  3.2     02-26  Mg     1.9     02-26    Urinalysis Basic - ( 26 Feb 2024 06:19 )    Color: x / Appearance: x / SG: x / pH: x  Gluc: 103 mg/dL / Ketone: x  / Bili: x / Urobili: x   Blood: x / Protein: x / Nitrite: x   Leuk Esterase: x / RBC: x / WBC x   Sq Epi: x / Non Sq Epi: x / Bacteria: x      CAPILLARY BLOOD GLUCOSE      POCT Blood Glucose.: 77 mg/dL (26 Feb 2024 17:04)  POCT Blood Glucose.: 83 mg/dL (26 Feb 2024 11:40)  POCT Blood Glucose.: 87 mg/dL (26 Feb 2024 08:03)  POCT Blood Glucose.: 95 mg/dL (25 Feb 2024 21:14)      RADIOLOGY & ADDITIONAL TESTS:  < from: CT Chest No Cont (02.26.24 @ 11:11) >  PROCEDURE DATE:  02/26/2024          INTERPRETATION:  HISTORY: Admitting Dxs: J18.9 PNEUMONIA, UNSPECIFIED   ORGANISM    EXAMINATION: CT CHEST was performed without IV contrast.    COMPARISON: 3/14/2017.    FINDINGS:    AIRWAYS, LUNGS, PLEURA: Bronchiectasis with mucus impaction most severely   involving the right middle lobe, lingular, and right lower lobe airways.   Multifocal consolidation, nodular opacities, and tree-in-bud opacities in   the right greater than left lower lobes and to lesser degree in the   lingula. Biapical scarring. No pleural effusion.    MEDIASTINUM: Normal heart size. No pericardial effusion. Thoracic aorta   normal caliber.  No large mediastinal lymph nodes.    IMAGED ABDOMEN: Cholecystectomy.    SOFT TISSUES: Unremarkable.    BONES: Unremarkable.      IMPRESSION:.    Multifocal pneumonia primarily involving the bilateral lower lobes.    CT chest follow-up in 3 months recommended to ensure clearing.    --- End of Report ---        < from: Xray Chest 1 View-PORTABLE IMMEDIATE (02.24.24 @ 14:06) >  PROCEDURE DATE:  02/24/2024        INTERPRETATION:  Chest portable upright    CLINICAL HISTORY: Shortness of breath    Comparison:  3/13/2017    Normal heart and mediastinum. Lungs reveal diffuse nodular coarsened lung   markings with scattered calcified granulomata. Areas of tubular   thickening are seen at the lung base greater on the right. Angles sharp.   Bones without acute abnormality.    IMPRESSION: Diffuse nodular interstitial markings have progressed with   areas of mucus impaction/bronchiectasis and calcified granulomata.    Please note patient had CT chest and 2017. Recommend follow-up CT chest   comparison.    --- End of Report ---      Imaging Personally Reviewed:  YES    Consultant(s) Notes Reviewed:   YES    Care Discussed with Consultants :     Plan of care was discussed with patient and /or primary care giver; all questions and concerns were addressed and care was aligned with patient's wishes.

## 2024-02-26 NOTE — DIETITIAN NUTRITION RISK NOTIFICATION - TREATMENT: THE FOLLOWING DIET HAS BEEN RECOMMENDED
Diet, Regular:   DASH/TLC {Sodium & Cholesterol Restricted}  Supplement Feeding Modality:  Oral  Ensure Compact Cans or Servings Per Day:  1       Frequency:  Two Times a day (02-26-24 @ 10:09) [Pending Verification By Attending]

## 2024-02-27 DIAGNOSIS — J18.9 PNEUMONIA, UNSPECIFIED ORGANISM: ICD-10-CM

## 2024-02-27 LAB
-  AMOXICILLIN/CLAVULANIC ACID: SIGNIFICANT CHANGE UP
-  AMPICILLIN/SULBACTAM: SIGNIFICANT CHANGE UP
-  AMPICILLIN/SULBACTAM: SIGNIFICANT CHANGE UP
-  AMPICILLIN: SIGNIFICANT CHANGE UP
-  AZTREONAM: SIGNIFICANT CHANGE UP
-  CEFAZOLIN: SIGNIFICANT CHANGE UP
-  CEFAZOLIN: SIGNIFICANT CHANGE UP
-  CEFEPIME: SIGNIFICANT CHANGE UP
-  CEFOXITIN: SIGNIFICANT CHANGE UP
-  CEFTRIAXONE: SIGNIFICANT CHANGE UP
-  CEFUROXIME: SIGNIFICANT CHANGE UP
-  CIPROFLOXACIN: SIGNIFICANT CHANGE UP
-  CLINDAMYCIN: SIGNIFICANT CHANGE UP
-  ERTAPENEM: SIGNIFICANT CHANGE UP
-  ERYTHROMYCIN: SIGNIFICANT CHANGE UP
-  GENTAMICIN: SIGNIFICANT CHANGE UP
-  GENTAMICIN: SIGNIFICANT CHANGE UP
-  IMIPENEM: SIGNIFICANT CHANGE UP
-  LEVOFLOXACIN: SIGNIFICANT CHANGE UP
-  MEROPENEM: SIGNIFICANT CHANGE UP
-  NITROFURANTOIN: SIGNIFICANT CHANGE UP
-  OXACILLIN: SIGNIFICANT CHANGE UP
-  PIPERACILLIN/TAZOBACTAM: SIGNIFICANT CHANGE UP
-  RIFAMPIN: SIGNIFICANT CHANGE UP
-  TETRACYCLINE: SIGNIFICANT CHANGE UP
-  TOBRAMYCIN: SIGNIFICANT CHANGE UP
-  TRIMETHOPRIM/SULFAMETHOXAZOLE: SIGNIFICANT CHANGE UP
-  TRIMETHOPRIM/SULFAMETHOXAZOLE: SIGNIFICANT CHANGE UP
-  VANCOMYCIN: SIGNIFICANT CHANGE UP
ANION GAP SERPL CALC-SCNC: 5 MMOL/L — SIGNIFICANT CHANGE UP (ref 5–17)
BUN SERPL-MCNC: 6 MG/DL — LOW (ref 7–18)
CALCIUM SERPL-MCNC: 9.1 MG/DL — SIGNIFICANT CHANGE UP (ref 8.4–10.5)
CHLORIDE SERPL-SCNC: 103 MMOL/L — SIGNIFICANT CHANGE UP (ref 96–108)
CO2 SERPL-SCNC: 29 MMOL/L — SIGNIFICANT CHANGE UP (ref 22–31)
CREAT SERPL-MCNC: 0.59 MG/DL — SIGNIFICANT CHANGE UP (ref 0.5–1.3)
CULTURE RESULTS: ABNORMAL
CULTURE RESULTS: ABNORMAL
EGFR: 90 ML/MIN/1.73M2 — SIGNIFICANT CHANGE UP
GLUCOSE SERPL-MCNC: 83 MG/DL — SIGNIFICANT CHANGE UP (ref 70–99)
HCT VFR BLD CALC: 37.5 % — SIGNIFICANT CHANGE UP (ref 34.5–45)
HGB BLD-MCNC: 11.7 G/DL — SIGNIFICANT CHANGE UP (ref 11.5–15.5)
M PNEUMO IGM SER-ACNC: 0.35 INDEX — SIGNIFICANT CHANGE UP (ref 0–0.9)
MCHC RBC-ENTMCNC: 29 PG — SIGNIFICANT CHANGE UP (ref 27–34)
MCHC RBC-ENTMCNC: 31.2 GM/DL — LOW (ref 32–36)
MCV RBC AUTO: 92.8 FL — SIGNIFICANT CHANGE UP (ref 80–100)
METHOD TYPE: SIGNIFICANT CHANGE UP
METHOD TYPE: SIGNIFICANT CHANGE UP
MYCOPLASMA AG SPEC QL: NEGATIVE — SIGNIFICANT CHANGE UP
NRBC # BLD: 0 /100 WBCS — SIGNIFICANT CHANGE UP (ref 0–0)
ORGANISM # SPEC MICROSCOPIC CNT: ABNORMAL
PLATELET # BLD AUTO: 416 K/UL — HIGH (ref 150–400)
POTASSIUM SERPL-MCNC: 3.5 MMOL/L — SIGNIFICANT CHANGE UP (ref 3.5–5.3)
POTASSIUM SERPL-SCNC: 3.5 MMOL/L — SIGNIFICANT CHANGE UP (ref 3.5–5.3)
RBC # BLD: 4.04 M/UL — SIGNIFICANT CHANGE UP (ref 3.8–5.2)
RBC # FLD: 13.1 % — SIGNIFICANT CHANGE UP (ref 10.3–14.5)
SODIUM SERPL-SCNC: 137 MMOL/L — SIGNIFICANT CHANGE UP (ref 135–145)
SPECIMEN SOURCE: SIGNIFICANT CHANGE UP
SPECIMEN SOURCE: SIGNIFICANT CHANGE UP
WBC # BLD: 9.36 K/UL — SIGNIFICANT CHANGE UP (ref 3.8–10.5)
WBC # FLD AUTO: 9.36 K/UL — SIGNIFICANT CHANGE UP (ref 3.8–10.5)

## 2024-02-27 RX ORDER — TIOTROPIUM BROMIDE 18 UG/1
2 CAPSULE ORAL; RESPIRATORY (INHALATION) DAILY
Refills: 0 | Status: DISCONTINUED | OUTPATIENT
Start: 2024-02-27 | End: 2024-02-28

## 2024-02-27 RX ORDER — MONTELUKAST 4 MG/1
10 TABLET, CHEWABLE ORAL AT BEDTIME
Refills: 0 | Status: DISCONTINUED | OUTPATIENT
Start: 2024-02-27 | End: 2024-02-28

## 2024-02-27 RX ADMIN — Medication 100 MILLIGRAM(S): at 22:06

## 2024-02-27 RX ADMIN — Medication 81 MILLIGRAM(S): at 11:46

## 2024-02-27 RX ADMIN — MONTELUKAST 10 MILLIGRAM(S): 4 TABLET, CHEWABLE ORAL at 22:06

## 2024-02-27 RX ADMIN — ENOXAPARIN SODIUM 30 MILLIGRAM(S): 100 INJECTION SUBCUTANEOUS at 05:08

## 2024-02-27 RX ADMIN — ATORVASTATIN CALCIUM 40 MILLIGRAM(S): 80 TABLET, FILM COATED ORAL at 22:06

## 2024-02-27 RX ADMIN — AZITHROMYCIN 255 MILLIGRAM(S): 500 TABLET, FILM COATED ORAL at 05:04

## 2024-02-27 RX ADMIN — TIOTROPIUM BROMIDE 2 PUFF(S): 18 CAPSULE ORAL; RESPIRATORY (INHALATION) at 13:35

## 2024-02-27 RX ADMIN — CEFTRIAXONE 100 MILLIGRAM(S): 500 INJECTION, POWDER, FOR SOLUTION INTRAMUSCULAR; INTRAVENOUS at 05:06

## 2024-02-27 RX ADMIN — Medication 100 MILLIGRAM(S): at 13:35

## 2024-02-27 NOTE — PROGRESS NOTE ADULT - PROBLEM SELECTOR PROBLEM 4
CAD (coronary artery disease)
HTN (hypertension)

## 2024-02-27 NOTE — PROGRESS NOTE ADULT - SUBJECTIVE AND OBJECTIVE BOX
NP Note discussed with  primary attending    Patient is a 83y old  Female who presents with a chief complaint of PNA, acute UTI (27 Feb 2024 09:46)      INTERVAL HPI/OVERNIGHT EVENTS: no new complaints    MEDICATIONS  (STANDING):  aspirin  chewable 81 milliGRAM(s) Oral daily  atorvastatin 40 milliGRAM(s) Oral at bedtime  azithromycin  IVPB 500 milliGRAM(s) IV Intermittent every 24 hours  benzonatate 100 milliGRAM(s) Oral three times a day  cefTRIAXone   IVPB 1000 milliGRAM(s) IV Intermittent every 24 hours  enoxaparin Injectable 30 milliGRAM(s) SubCutaneous every 24 hours  montelukast 10 milliGRAM(s) Oral at bedtime  tiotropium 2.5 MICROgram(s) Inhaler 2 Puff(s) Inhalation daily    MEDICATIONS  (PRN):  acetaminophen     Tablet .. 650 milliGRAM(s) Oral every 6 hours PRN Temp greater or equal to 38C (100.4F), Mild Pain (1 - 3)  aluminum hydroxide/magnesium hydroxide/simethicone Suspension 30 milliLiter(s) Oral every 4 hours PRN Dyspepsia  melatonin 3 milliGRAM(s) Oral at bedtime PRN Insomnia  ondansetron Injectable 4 milliGRAM(s) IV Push every 8 hours PRN Nausea and/or Vomiting      __________________________________________________  REVIEW OF SYSTEMS:    CONSTITUTIONAL: No fever,   EYES: no acute visual disturbances  NECK: No pain or stiffness  RESPIRATORY: No cough; No shortness of breath  CARDIOVASCULAR: No chest pain, no palpitations  GASTROINTESTINAL: No pain. No nausea or vomiting; No diarrhea   NEUROLOGICAL: No headache or numbness, no tremors  MUSCULOSKELETAL: No joint pain, no muscle pain  GENITOURINARY: no dysuria, no frequency, no hesitancy  PSYCHIATRY: no depression , no anxiety  ALL OTHER  ROS negative        Vital Signs Last 24 Hrs  T(C): 36.7 (27 Feb 2024 13:33), Max: 37 (27 Feb 2024 05:08)  T(F): 98 (27 Feb 2024 13:33), Max: 98.6 (27 Feb 2024 05:08)  HR: 90 (27 Feb 2024 13:33) (87 - 93)  BP: 103/69 (27 Feb 2024 13:33) (103/56 - 122/70)  BP(mean): --  RR: 18 (27 Feb 2024 13:33) (18 - 20)  SpO2: 94% (27 Feb 2024 13:33) (94% - 96%)    Parameters below as of 27 Feb 2024 13:33  Patient On (Oxygen Delivery Method): room air        ________________________________________________  PHYSICAL EXAM:  GENERAL: NAD  HEENT: Normocephalic;  conjunctivae and sclerae clear; moist mucous membranes;   NECK : supple  CHEST/LUNG: Clear to ausculitation bilaterally with good air entry   HEART: S1 S2  regular; no murmurs, gallops or rubs  ABDOMEN: Soft, Nontender, Nondistended; Bowel sounds present  EXTREMITIES: no cyanosis; no edema; no calf tenderness  SKIN: warm and dry; no rash  NERVOUS SYSTEM:  Awake and alert; Oriented  to place, person and time ; no new deficits    _________________________________________________  LABS:                        11.7   9.36  )-----------( 416      ( 27 Feb 2024 08:57 )             37.5     02-27    137  |  103  |  6<L>  ----------------------------<  83  3.5   |  29  |  0.59    Ca    9.1      27 Feb 2024 08:57  Phos  3.2     02-26  Mg     1.9     02-26        Urinalysis Basic - ( 27 Feb 2024 08:57 )    Color: x / Appearance: x / SG: x / pH: x  Gluc: 83 mg/dL / Ketone: x  / Bili: x / Urobili: x   Blood: x / Protein: x / Nitrite: x   Leuk Esterase: x / RBC: x / WBC x   Sq Epi: x / Non Sq Epi: x / Bacteria: x      CAPILLARY BLOOD GLUCOSE            RADIOLOGY & ADDITIONAL TESTS:    Imaging Personally Reviewed:  YES/NO    Consultant(s) Notes Reviewed:   YES/ No    Care Discussed with Consultants :     Plan of care was discussed with patient and /or primary care giver; all questions and concerns were addressed and care was aligned with patient's wishes.

## 2024-02-27 NOTE — PROGRESS NOTE ADULT - PROBLEM SELECTOR PLAN 3
d/w pt can retry.
Cultures noted  Antibiotics
monitor BP  cont meds
B/P soft   C/W IV fluids   Monitor B/P and restart meds if needed
B/P soft   C/W IV fluids   Monitor B/P and restart meds if needed

## 2024-02-27 NOTE — PROGRESS NOTE ADULT - SUBJECTIVE AND OBJECTIVE BOX
Patient is a 83y old  Female who presents with a chief complaint of PNA, acute UTI (27 Feb 2024 06:29)  Awake, alert, comfortable in bed in NAD. Feeling better    INTERVAL HPI/OVERNIGHT EVENTS:      VITAL SIGNS:  T(F): 98.6 (02-27-24 @ 05:08)  HR: 87 (02-27-24 @ 05:08)  BP: 122/70 (02-27-24 @ 05:08)  RR: 20 (02-27-24 @ 05:08)  SpO2: 95% (02-27-24 @ 05:08)  Wt(kg): --  I&O's Detail          REVIEW OF SYSTEMS:    CONSTITUTIONAL:  No fevers, chills, sweats    HEENT:  Eyes:  No diplopia or blurred vision. ENT:  No earache, sore throat or runny nose.    CARDIOVASCULAR:  No pressure, squeezing, tightness, or heaviness about the chest; no palpitations.    RESPIRATORY:  Per HPI    GASTROINTESTINAL:  No abdominal pain, nausea, vomiting or diarrhea.    GENITOURINARY:  No dysuria, frequency or urgency.    NEUROLOGIC:  No paresthesias, fasciculations, seizures or weakness.    PSYCHIATRIC:  No disorder of thought or mood.      PHYSICAL EXAM:    Constitutional: Well developed and nourished  Eyes:Perrla  ENMT: normal  Neck:supple  Respiratory: good air entry  Cardiovascular: S1 S2 regular  Gastrointestinal: Soft, Non tender  Extremities: No edema  Vascular:normal  Neurological:Awake, alert,Ox3  Musculoskeletal:Normal      MEDICATIONS  (STANDING):  aspirin  chewable 81 milliGRAM(s) Oral daily  atorvastatin 40 milliGRAM(s) Oral at bedtime  azithromycin  IVPB 500 milliGRAM(s) IV Intermittent every 24 hours  cefTRIAXone   IVPB 1000 milliGRAM(s) IV Intermittent every 24 hours  enoxaparin Injectable 30 milliGRAM(s) SubCutaneous every 24 hours  sodium chloride 0.9%. 1000 milliLiter(s) (125 mL/Hr) IV Continuous <Continuous>    MEDICATIONS  (PRN):  acetaminophen     Tablet .. 650 milliGRAM(s) Oral every 6 hours PRN Temp greater or equal to 38C (100.4F), Mild Pain (1 - 3)  aluminum hydroxide/magnesium hydroxide/simethicone Suspension 30 milliLiter(s) Oral every 4 hours PRN Dyspepsia  melatonin 3 milliGRAM(s) Oral at bedtime PRN Insomnia  ondansetron Injectable 4 milliGRAM(s) IV Push every 8 hours PRN Nausea and/or Vomiting      Allergies    1-Day (Pruritus; Flushing; Headache)  vancomycin (Flushing; Pruritus)    Intolerances        LABS:                        11.7   9.36  )-----------( 416      ( 27 Feb 2024 08:57 )             37.5     02-27    137  |  103  |  6<L>  ----------------------------<  83  3.5   |  29  |  0.59    Ca    9.1      27 Feb 2024 08:57  Phos  3.2     02-26  Mg     1.9     02-26        Urinalysis Basic - ( 27 Feb 2024 08:57 )    Color: x / Appearance: x / SG: x / pH: x  Gluc: 83 mg/dL / Ketone: x  / Bili: x / Urobili: x   Blood: x / Protein: x / Nitrite: x   Leuk Esterase: x / RBC: x / WBC x   Sq Epi: x / Non Sq Epi: x / Bacteria: x      Culture - Sputum . (02.25.24 @ 05:23)   Gram Stain:   Few polymorphonuclear leukocytes per low power field   Few Squamous epithelial cells per low power field   Few Gram Negative Rods seen per oil power field   Few Gram Positive Rods seen per oil power field  Specimen Source: .Sputum Sputum  Culture Results:   Moderate Staphylococcus aureus   Normal Respiratory Renea presentCulture Results:   >100,000 CFU/ml Escherichia coli  Organism Identification: Escherichia coli  Organism: Escherichia coli  Method Type: MICCulture - Blood (02.24.24 @ 13:30)   Specimen Source: .Blood Blood  Culture Results:   No growth at 48 HoursCulture - Blood (02.24.24 @ 13:30)   Specimen Source: .Blood Blood  Culture Results:   No growth at 48 Hours      CAPILLARY BLOOD GLUCOSE      POCT Blood Glucose.: 77 mg/dL (26 Feb 2024 17:04)  POCT Blood Glucose.: 83 mg/dL (26 Feb 2024 11:40)        RADIOLOGY & ADDITIONAL TESTS:    CXR:  < from: CT Chest No Cont (02.26.24 @ 11:11) >  IMPRESSION:.    Multifocal pneumonia primarily involving the bilateral lower lobes.    CT chest follow-up in 3 months recommended to ensure clearing.      < end of copied text >    Ct scan chest:    ekg;    echo:

## 2024-02-27 NOTE — PROGRESS NOTE ADULT - SUBJECTIVE AND OBJECTIVE BOX
Patient is a 83y old  Female who presents with a chief complaint of PNA, acute UTI (26 Feb 2024 19:21)    pt seen in icu [  ], reg med floor [ x  ], bed [ x ], chair at bedside [   ], a+o x3 [x  ], lethargic [  ],    nad [ x ]      Allergies    1-Day (Pruritus; Flushing; Headache)  vancomycin (Flushing; Pruritus)        Vitals    T(F): 98.6 (02-27-24 @ 05:08), Max: 98.6 (02-27-24 @ 05:08)  HR: 87 (02-27-24 @ 05:08) (86 - 93)  BP: 122/70 (02-27-24 @ 05:08) (103/56 - 122/70)  RR: 20 (02-27-24 @ 05:08) (16 - 20)  SpO2: 95% (02-27-24 @ 05:08) (95% - 96%)  Wt(kg): --  CAPILLARY BLOOD GLUCOSE      POCT Blood Glucose.: 77 mg/dL (26 Feb 2024 17:04)      Labs                          10.9   8.18  )-----------( 398      ( 26 Feb 2024 06:19 )             34.7       02-26    136  |  103  |  8   ----------------------------<  103<H>  3.5   |  28  |  0.61    Ca    8.8      26 Feb 2024 06:19  Phos  3.2     02-26  Mg     1.9     02-26            Troponin I, High Sensitivity Result: 4.2 ng/L (02-24-24 @ 13:30)    .Sputum Sputum  02-25 @ 05:23   Moderate Staphylococcus aureus  Normal Respiratory Renea present  --    Few polymorphonuclear leukocytes per low power field  Few Squamous epithelial cells per low power field  Few Gram Negative Rods seen per oil power field  Few Gram Positive Rods seen per oil power field      .Blood Blood  02-24 @ 13:30   No growth at 48 Hours  --  --          Radiology Results      Meds    MEDICATIONS  (STANDING):  aspirin  chewable 81 milliGRAM(s) Oral daily  atorvastatin 40 milliGRAM(s) Oral at bedtime  azithromycin  IVPB 500 milliGRAM(s) IV Intermittent every 24 hours  cefTRIAXone   IVPB 1000 milliGRAM(s) IV Intermittent every 24 hours  enoxaparin Injectable 30 milliGRAM(s) SubCutaneous every 24 hours  sodium chloride 0.9%. 1000 milliLiter(s) (125 mL/Hr) IV Continuous <Continuous>      MEDICATIONS  (PRN):  acetaminophen     Tablet .. 650 milliGRAM(s) Oral every 6 hours PRN Temp greater or equal to 38C (100.4F), Mild Pain (1 - 3)  aluminum hydroxide/magnesium hydroxide/simethicone Suspension 30 milliLiter(s) Oral every 4 hours PRN Dyspepsia  melatonin 3 milliGRAM(s) Oral at bedtime PRN Insomnia  ondansetron Injectable 4 milliGRAM(s) IV Push every 8 hours PRN Nausea and/or Vomiting      Physical Exam    Neuro :  no focal deficits  Respiratory: CTA B/L  CV: RRR, S1S2, no murmurs,   Abdominal: Soft, NT, ND +BS,  Extremities: No edema, + peripheral pulses    ASSESSMENT    pneumonia,   uti,   h/o  HTN   CAD s/p stents  S/P cholecystectomy        PLAN    rocephin and maria de jesus,   blood cx neg  f/u ucx   duonebs,   supplemental oxygen prn,   pulm f/u   spiriva  pfts as OP  f/u CT chest with no contrast.  cont current meds          Patient is a 83y old  Female who presents with a chief complaint of PNA, acute UTI (26 Feb 2024 19:21)    pt seen in icu [  ], reg med floor [ x  ], bed [ x ], chair at bedside [   ], a+o x3 [x  ], lethargic [  ],    nad [ x ]      Allergies    1-Day (Pruritus; Flushing; Headache)  vancomycin (Flushing; Pruritus)        Vitals    T(F): 98.6 (02-27-24 @ 05:08), Max: 98.6 (02-27-24 @ 05:08)  HR: 87 (02-27-24 @ 05:08) (86 - 93)  BP: 122/70 (02-27-24 @ 05:08) (103/56 - 122/70)  RR: 20 (02-27-24 @ 05:08) (16 - 20)  SpO2: 95% (02-27-24 @ 05:08) (95% - 96%)  Wt(kg): --  CAPILLARY BLOOD GLUCOSE      POCT Blood Glucose.: 77 mg/dL (26 Feb 2024 17:04)      Labs                          10.9   8.18  )-----------( 398      ( 26 Feb 2024 06:19 )             34.7       02-26    136  |  103  |  8   ----------------------------<  103<H>  3.5   |  28  |  0.61    Ca    8.8      26 Feb 2024 06:19  Phos  3.2     02-26  Mg     1.9     02-26            Troponin I, High Sensitivity Result: 4.2 ng/L (02-24-24 @ 13:30)    .Sputum Sputum  02-25 @ 05:23   Moderate Staphylococcus aureus  Normal Respiratory Renea present  --    Few polymorphonuclear leukocytes per low power field  Few Squamous epithelial cells per low power field  Few Gram Negative Rods seen per oil power field  Few Gram Positive Rods seen per oil power field      .Blood Blood  02-24 @ 13:30   No growth at 48 Hours  --  --      Culture - Urine (02.24.24 @ 13:30)   >100,000 CFU/ml Escherichia coli  - Amoxicillin/Clavulanic Acid: S <=8/4  - Ampicillin: R >16 These ampicillin results predict results for amoxicillin  - Ampicillin/Sulbactam: S 8/4  - Aztreonam: S <=4  - Cefazolin: S <=2 For uncomplicated UTI with K. pneumoniae, E. coli, or P. mirablis: KIRTI <=16 is sensitive and KIRTI >=32 is resistant. This also predicts results for oral agents cefaclor, cefdinir, cefpodoxime, cefprozil, cefuroxime axetil, cephalexin and locarbef for uncomplicated UTI. Note that some isolates may be susceptible to these agents while testing resistant to cefazolin.  - Cefepime: S <=2  - Cefoxitin: S <=8  - Ceftriaxone: S <=1  - Cefuroxime: S <=4  - Ciprofloxacin: I 0.5  - Ertapenem: S <=0.5  - Gentamicin: S <=2  - Imipenem: S <=1  - Levofloxacin: S <=0.5  - Meropenem: S <=1  - Nitrofurantoin: S <=32 Should not be used to treat pyelonephritis  - Piperacillin/Tazobactam: S <=8  - Tobramycin: S <=2  - Trimethoprim/Sulfamethoxazole: R >2/38        Radiology Results    < from: CT Chest No Cont (02.26.24 @ 11:11) >  FINDINGS:    AIRWAYS, LUNGS, PLEURA: Bronchiectasis with mucus impaction most severely   involving the right middle lobe, lingular, and right lower lobe airways.   Multifocal consolidation, nodular opacities, and tree-in-bud opacities in   the right greater than left lower lobes and to lesser degree in the   lingula. Biapical scarring. No pleural effusion.    MEDIASTINUM: Normal heart size. No pericardial effusion. Thoracic aorta   normal caliber.  No large mediastinal lymph nodes.    IMAGED ABDOMEN: Cholecystectomy.    SOFT TISSUES: Unremarkable.    BONES: Unremarkable.      IMPRESSION:.    Multifocal pneumonia primarily involving the bilateral lower lobes.    CT chest follow-up in 3 months recommended to ensure clearing.    < end of copied text >        Meds    MEDICATIONS  (STANDING):  aspirin  chewable 81 milliGRAM(s) Oral daily  atorvastatin 40 milliGRAM(s) Oral at bedtime  azithromycin  IVPB 500 milliGRAM(s) IV Intermittent every 24 hours  cefTRIAXone   IVPB 1000 milliGRAM(s) IV Intermittent every 24 hours  enoxaparin Injectable 30 milliGRAM(s) SubCutaneous every 24 hours  sodium chloride 0.9%. 1000 milliLiter(s) (125 mL/Hr) IV Continuous <Continuous>      MEDICATIONS  (PRN):  acetaminophen     Tablet .. 650 milliGRAM(s) Oral every 6 hours PRN Temp greater or equal to 38C (100.4F), Mild Pain (1 - 3)  aluminum hydroxide/magnesium hydroxide/simethicone Suspension 30 milliLiter(s) Oral every 4 hours PRN Dyspepsia  melatonin 3 milliGRAM(s) Oral at bedtime PRN Insomnia  ondansetron Injectable 4 milliGRAM(s) IV Push every 8 hours PRN Nausea and/or Vomiting      Physical Exam    Neuro :  no focal deficits  Respiratory: CTA B/L  CV: RRR, S1S2, no murmurs,   Abdominal: Soft, NT, ND +BS,  Extremities: No edema, + peripheral pulses    ASSESSMENT    pneumonia,   uti,   h/o  HTN   CAD s/p stents  S/P cholecystectomy        PLAN    rocephin and zith until 3/1/23,  blood cx neg  ucx and sens with e coli   duonebs,   supplemental oxygen prn,   pulm f/u   tessalon pereles x 3 days  spiriva   montelukast 10 mgs po Qhs  pfts as OP  CT chest with Multifocal pneumonia primarily involving the bilateral lower lobes.  CT chest follow-up in 3 months recommended to ensure clearing noted above.    Follow up CT chest in 6 weeks   cont current meds   d/c plan for am if stable

## 2024-02-27 NOTE — PROGRESS NOTE ADULT - PROBLEM SELECTOR PLAN 4
cont meds  Cardio follow up as OP
monitor BP  cont meds
C/W ASA and Atorvastatin 40mg
C/W ASA and Atorvastatin 40mg

## 2024-02-27 NOTE — PROGRESS NOTE ADULT - PROBLEM SELECTOR PLAN 6
pt from home   Likely d/c back pending cx and sensitivities
pt from home   Likely d/c back pending cx and sensitivities

## 2024-02-27 NOTE — PROGRESS NOTE ADULT - PROBLEM SELECTOR PLAN 1
Bronchodilators  steroids  antibiotics  montelukast 10 mgs po Qhs  pFTs as op
C/W azithromycin and ceftriaxone  Sputum grew gram negative rods  F/U final blood cx NGTD x 24 hours
cultures noted  antibiotics  monitor temp and WBC   Follow up CT chest in 6 weeks
P/w SOB and cough  C/W azithromycin and ceftriaxone  Sputum grew gram negative rods  F/U final blood cx NGTD x 24 hours

## 2024-02-27 NOTE — PROGRESS NOTE ADULT - PROBLEM SELECTOR PLAN 2
CHECK PENDING CULTURES  ANTIBIOTICS
Bronchodilators  steroids  antibiotics  montelukast 10 mgs po Qhs  PFTs as op
Urine cx grew E. coli  C/W Ceftriaxone and Azithromax  F/u sensitivities
Urine cx grew E. coli  C/W Ceftriaxone and Azithromax  F/u sensitivities

## 2024-02-27 NOTE — PROGRESS NOTE ADULT - ASSESSMENT
83F from home with PMH HTN and CAD s/p stents presenting to the ED with SOB and cough admitted for PNA and UTI. 
83F from home with PMH HTN and CAD s/p stents presenting to the ED with SOB and cough   CT chest Multifocal pneumonia primarily involving the bilateral lower lobes.  CT chest follow-up in 3 months recommended to ensure clearing.  Urinalysis positive   Admitted for PNA and UTI.   Started on Ceftriaxone and Zithromax   Urine culture growing E coli

## 2024-02-28 ENCOUNTER — TRANSCRIPTION ENCOUNTER (OUTPATIENT)
Age: 84
End: 2024-02-28

## 2024-02-28 VITALS
RESPIRATION RATE: 16 BRPM | SYSTOLIC BLOOD PRESSURE: 117 MMHG | OXYGEN SATURATION: 94 % | HEART RATE: 102 BPM | DIASTOLIC BLOOD PRESSURE: 78 MMHG | TEMPERATURE: 98 F

## 2024-02-28 PROCEDURE — 84100 ASSAY OF PHOSPHORUS: CPT

## 2024-02-28 PROCEDURE — 99285 EMERGENCY DEPT VISIT HI MDM: CPT

## 2024-02-28 PROCEDURE — 87040 BLOOD CULTURE FOR BACTERIA: CPT

## 2024-02-28 PROCEDURE — 94640 AIRWAY INHALATION TREATMENT: CPT

## 2024-02-28 PROCEDURE — 71045 X-RAY EXAM CHEST 1 VIEW: CPT

## 2024-02-28 PROCEDURE — 87186 SC STD MICRODIL/AGAR DIL: CPT

## 2024-02-28 PROCEDURE — 87449 NOS EACH ORGANISM AG IA: CPT

## 2024-02-28 PROCEDURE — 83605 ASSAY OF LACTIC ACID: CPT

## 2024-02-28 PROCEDURE — 83880 ASSAY OF NATRIURETIC PEPTIDE: CPT

## 2024-02-28 PROCEDURE — 83930 ASSAY OF BLOOD OSMOLALITY: CPT

## 2024-02-28 PROCEDURE — 80048 BASIC METABOLIC PNL TOTAL CA: CPT

## 2024-02-28 PROCEDURE — 82009 KETONE BODYS QUAL: CPT

## 2024-02-28 PROCEDURE — 71250 CT THORAX DX C-: CPT | Mod: MC

## 2024-02-28 PROCEDURE — 82962 GLUCOSE BLOOD TEST: CPT

## 2024-02-28 PROCEDURE — 87070 CULTURE OTHR SPECIMN AEROBIC: CPT

## 2024-02-28 PROCEDURE — 80053 COMPREHEN METABOLIC PANEL: CPT

## 2024-02-28 PROCEDURE — 93005 ELECTROCARDIOGRAM TRACING: CPT

## 2024-02-28 PROCEDURE — 87086 URINE CULTURE/COLONY COUNT: CPT

## 2024-02-28 PROCEDURE — 82550 ASSAY OF CK (CPK): CPT

## 2024-02-28 PROCEDURE — 0225U NFCT DS DNA&RNA 21 SARSCOV2: CPT

## 2024-02-28 PROCEDURE — 84484 ASSAY OF TROPONIN QUANT: CPT

## 2024-02-28 PROCEDURE — 87899 AGENT NOS ASSAY W/OPTIC: CPT

## 2024-02-28 PROCEDURE — 96375 TX/PRO/DX INJ NEW DRUG ADDON: CPT

## 2024-02-28 PROCEDURE — 86738 MYCOPLASMA ANTIBODY: CPT

## 2024-02-28 PROCEDURE — 96374 THER/PROPH/DIAG INJ IV PUSH: CPT

## 2024-02-28 PROCEDURE — 83735 ASSAY OF MAGNESIUM: CPT

## 2024-02-28 PROCEDURE — 85025 COMPLETE CBC W/AUTO DIFF WBC: CPT

## 2024-02-28 PROCEDURE — 36415 COLL VENOUS BLD VENIPUNCTURE: CPT

## 2024-02-28 PROCEDURE — 81001 URINALYSIS AUTO W/SCOPE: CPT

## 2024-02-28 PROCEDURE — 85027 COMPLETE CBC AUTOMATED: CPT

## 2024-02-28 PROCEDURE — 87077 CULTURE AEROBIC IDENTIFY: CPT

## 2024-02-28 RX ADMIN — AZITHROMYCIN 255 MILLIGRAM(S): 500 TABLET, FILM COATED ORAL at 05:36

## 2024-02-28 RX ADMIN — Medication 81 MILLIGRAM(S): at 12:16

## 2024-02-28 RX ADMIN — Medication 100 MILLIGRAM(S): at 12:16

## 2024-02-28 RX ADMIN — CEFTRIAXONE 100 MILLIGRAM(S): 500 INJECTION, POWDER, FOR SOLUTION INTRAMUSCULAR; INTRAVENOUS at 05:37

## 2024-02-28 RX ADMIN — Medication 100 MILLIGRAM(S): at 05:38

## 2024-02-28 RX ADMIN — ENOXAPARIN SODIUM 30 MILLIGRAM(S): 100 INJECTION SUBCUTANEOUS at 05:37

## 2024-02-28 RX ADMIN — TIOTROPIUM BROMIDE 2 PUFF(S): 18 CAPSULE ORAL; RESPIRATORY (INHALATION) at 12:17

## 2024-02-28 NOTE — PROGRESS NOTE ADULT - PROVIDER SPECIALTY LIST ADULT
Internal Medicine
Pulmonology
Internal Medicine
Internal Medicine
Pulmonology
Pulmonology

## 2024-02-28 NOTE — DISCHARGE NOTE PROVIDER - PROVIDER TOKENS
PROVIDER:[TOKEN:[[id number]],FOLLOWUP:[1-3 days],ESTABLISHEDPATIENT:[T]] PROVIDER:[TOKEN:[[id number]],FOLLOWUP:[1-3 days],ESTABLISHEDPATIENT:[T]],PROVIDER:[TOKEN:[[id number]]]

## 2024-02-28 NOTE — DISCHARGE NOTE NURSING/CASE MANAGEMENT/SOCIAL WORK - PATIENT PORTAL LINK FT
You can access the FollowMyHealth Patient Portal offered by [facility] by registering at the following website: [email or web address]. By joining [employer]’s FollowMyHealth portal, you will also be able to view your health information using other applications (apps) compatible with our system.

## 2024-02-28 NOTE — DISCHARGE NOTE PROVIDER - NSDCCPCAREPLAN_GEN_ALL_CORE_FT
PRINCIPAL DISCHARGE DIAGNOSIS  Diagnosis: PNA (pneumonia)  Assessment and Plan of Treatment: CT chest showed that you had multifocal pneumonia. You were started on IV antbiotics. Depending on when you are discharged, you may need to take oral antibiotics. If this is the case, please take antibiotics as presribed and finish the prescription. You will need a repeat CT chest in 3 months.      SECONDARY DISCHARGE DIAGNOSES  Diagnosis: Acute UTI  Assessment and Plan of Treatment: Upon admission it was determined that you had an urinary tract infection. You were started on IV antibiotics. You may need to take oral antibiotics upon discharge. Please take all medications aas prescribed.    Diagnosis: HTN (hypertension)  Assessment and Plan of Treatment: Your blood pressure was low while in  the hospital. Your primary doctor will decide when to restart your high blood pressure.  Follow a low sodium diet at home.    Diagnosis: CAD (coronary artery disease)  Assessment and Plan of Treatment: Continue taking aspirin and atorvastatin medications as prescribed.

## 2024-02-28 NOTE — PROGRESS NOTE ADULT - SUBJECTIVE AND OBJECTIVE BOX
Patient is a 83y old  Female who presents with a chief complaint of PNA, acute UTI (27 Feb 2024 17:40)    pt seen in icu [  ], reg med floor [ x  ], bed [ x ], chair at bedside [   ], a+o x3 [x  ], lethargic [  ],    nad [ x ]        Allergies    1-Day (Pruritus; Flushing; Headache)  vancomycin (Flushing; Pruritus)        Vitals    T(F): 98.3 (02-28-24 @ 05:05), Max: 98.3 (02-28-24 @ 05:05)  HR: 80 (02-28-24 @ 05:05) (80 - 90)  BP: 116/73 (02-28-24 @ 05:05) (103/69 - 120/62)  RR: 19 (02-28-24 @ 05:05) (18 - 19)  SpO2: 96% (02-28-24 @ 05:05) (94% - 96%)  Wt(kg): --  CAPILLARY BLOOD GLUCOSE      POCT Blood Glucose.: 77 mg/dL (26 Feb 2024 17:04)      Labs                          11.7   9.36  )-----------( 416      ( 27 Feb 2024 08:57 )             37.5       02-27    137  |  103  |  6<L>  ----------------------------<  83  3.5   |  29  |  0.59    Ca    9.1      27 Feb 2024 08:57              .Sputum Sputum  02-25 @ 05:23   Moderate Staphylococcus aureus  Normal Respiratory Renea present  --  Staphylococcus aureus      .Blood Blood  02-24 @ 13:30   No growth at 72 Hours  --  Escherichia coli          Radiology Results      Meds    MEDICATIONS  (STANDING):  aspirin  chewable 81 milliGRAM(s) Oral daily  atorvastatin 40 milliGRAM(s) Oral at bedtime  azithromycin  IVPB 500 milliGRAM(s) IV Intermittent every 24 hours  benzonatate 100 milliGRAM(s) Oral three times a day  cefTRIAXone   IVPB 1000 milliGRAM(s) IV Intermittent every 24 hours  enoxaparin Injectable 30 milliGRAM(s) SubCutaneous every 24 hours  montelukast 10 milliGRAM(s) Oral at bedtime  tiotropium 2.5 MICROgram(s) Inhaler 2 Puff(s) Inhalation daily      MEDICATIONS  (PRN):  acetaminophen     Tablet .. 650 milliGRAM(s) Oral every 6 hours PRN Temp greater or equal to 38C (100.4F), Mild Pain (1 - 3)  aluminum hydroxide/magnesium hydroxide/simethicone Suspension 30 milliLiter(s) Oral every 4 hours PRN Dyspepsia  melatonin 3 milliGRAM(s) Oral at bedtime PRN Insomnia  ondansetron Injectable 4 milliGRAM(s) IV Push every 8 hours PRN Nausea and/or Vomiting      Physical Exam    Neuro :  no focal deficits  Respiratory: CTA B/L  CV: RRR, S1S2, no murmurs,   Abdominal: Soft, NT, ND +BS,  Extremities: No edema, + peripheral pulses    ASSESSMENT    pneumonia,   uti,   h/o  HTN   CAD s/p stents  S/P cholecystectomy        PLAN    rocephin and zith until 3/1/23,  blood cx neg  ucx and sens with e coli   duonebs,   supplemental oxygen prn,   pulm f/u   tessalon pereles x 3 days  spiriva   montelukast 10 mgs po Qhs  pfts as OP  CT chest with Multifocal pneumonia primarily involving the bilateral lower lobes.  CT chest follow-up in 3 months recommended to ensure clearing noted above.    Follow up CT chest in 6 weeks   cont current meds   d/c plan for am if stable       Patient is a 83y old  Female who presents with a chief complaint of PNA, acute UTI (27 Feb 2024 17:40)    pt seen in icu [  ], reg med floor [ x  ], bed [ x ], chair at bedside [   ], a+o x3 [x  ], lethargic [  ],    nad [ x ]        Allergies    1-Day (Pruritus; Flushing; Headache)  vancomycin (Flushing; Pruritus)        Vitals    T(F): 98.3 (02-28-24 @ 05:05), Max: 98.3 (02-28-24 @ 05:05)  HR: 80 (02-28-24 @ 05:05) (80 - 90)  BP: 116/73 (02-28-24 @ 05:05) (103/69 - 120/62)  RR: 19 (02-28-24 @ 05:05) (18 - 19)  SpO2: 96% (02-28-24 @ 05:05) (94% - 96%)  Wt(kg): --  CAPILLARY BLOOD GLUCOSE      POCT Blood Glucose.: 77 mg/dL (26 Feb 2024 17:04)      Labs                          11.7   9.36  )-----------( 416      ( 27 Feb 2024 08:57 )             37.5       02-27    137  |  103  |  6<L>  ----------------------------<  83  3.5   |  29  |  0.59    Ca    9.1      27 Feb 2024 08:57              .Sputum Sputum  02-25 @ 05:23   Moderate Staphylococcus aureus  Normal Respiratory Renea present  --  Staphylococcus aureus  - Rifampin: S <=1 Should not be used as monotherapy  - Tetracycline: S <=1  - Trimethoprim/Sulfamethoxazole: S <=0.5/9.5  - Vancomycin: S 2  Gram Stain:   Few polymorphonuclear leukocytes per low power field   Few Squamous epithelial cells per low power field   Few Gram Negative Rods seen per oil power field   Few Gram Positive Rods seen per oil power field  - Ampicillin/Sulbactam: S <=8/4  - Cefazolin: S <=4  - Clindamycin: S <=0.25  - Erythromycin: S <=0.25  - Gentamicin: S <=1 Should not be used as monotherapy  - Oxacillin: S <=0.25 Oxacillin predicts susceptibility for dicloxacillin, methicillin, and nafcillin      .Blood Blood  02-24 @ 13:30   No growth at 72 Hours        Culture - Urine (02.24.24 @ 13:30)   --  Escherichia coli  - Tobramycin: S <=2  - Trimethoprim/Sulfamethoxazole: R >2/38  - Ampicillin/Sulbactam: S 8/4  - Aztreonam: S <=4  - Cefepime: S <=2  - Amoxicillin/Clavulanic Acid: S <=8/4  - Ampicillin: R >16 These ampicillin results predict results for amoxicillin  - Piperacillin/Tazobactam: S <=8  - Meropenem: S <=1  - Nitrofurantoin: S <=32 Should not be used to treat pyelonephritis  - Gentamicin: S <=2  - Imipenem: S <=1  - Levofloxacin: S <=0.5  - Ertapenem: S <=0.5  - Cefazolin: S <=2 For uncomplicated UTI with K. pneumoniae, E. coli, or P. mirablis: KIRTI <=16 is sensitive and KIRTI >=32 is resistant. This also predicts results for oral agents cefaclor, cefdinir, cefpodoxime, cefprozil, cefuroxime axetil, cephalexin and locarbef for uncomplicated UTI. Note that some isolates may be susceptible to these agents while testing resistant to cefazolin.  - Cefoxitin: S <=8  - Ceftriaxone: S <=1  - Cefuroxime: S <=4  - Ciprofloxacin: I 0.5            Radiology Results      Meds    MEDICATIONS  (STANDING):  aspirin  chewable 81 milliGRAM(s) Oral daily  atorvastatin 40 milliGRAM(s) Oral at bedtime  azithromycin  IVPB 500 milliGRAM(s) IV Intermittent every 24 hours  benzonatate 100 milliGRAM(s) Oral three times a day  cefTRIAXone   IVPB 1000 milliGRAM(s) IV Intermittent every 24 hours  enoxaparin Injectable 30 milliGRAM(s) SubCutaneous every 24 hours  montelukast 10 milliGRAM(s) Oral at bedtime  tiotropium 2.5 MICROgram(s) Inhaler 2 Puff(s) Inhalation daily      MEDICATIONS  (PRN):  acetaminophen     Tablet .. 650 milliGRAM(s) Oral every 6 hours PRN Temp greater or equal to 38C (100.4F), Mild Pain (1 - 3)  aluminum hydroxide/magnesium hydroxide/simethicone Suspension 30 milliLiter(s) Oral every 4 hours PRN Dyspepsia  melatonin 3 milliGRAM(s) Oral at bedtime PRN Insomnia  ondansetron Injectable 4 milliGRAM(s) IV Push every 8 hours PRN Nausea and/or Vomiting      Physical Exam    Neuro :  no focal deficits  Respiratory: CTA B/L  CV: RRR, S1S2, no murmurs,   Abdominal: Soft, NT, ND +BS,  Extremities: No edema, + peripheral pulses    ASSESSMENT    pneumonia,   uti,   h/o  HTN   CAD s/p stents  S/P cholecystectomy        PLAN    rocephin and zith until 3/1/23,  change abx on d/c to augmentin 875 mg q12 until 3/1/24  blood cx neg  ucx and sens with e coli noted above  duonebs,   supplemental oxygen prn,   pulm f/u   tessalon pereles x 3 days  spiriva   montelukast 10 mgs po Qhs  pfts as OP  CT chest with Multifocal pneumonia primarily involving the bilateral lower lobes.  CT chest follow-up in 3 months recommended to ensure clearing noted above.    Follow up CT chest in 6 weeks   cont current meds   pt stable for d/c

## 2024-02-28 NOTE — PROGRESS NOTE ADULT - SUBJECTIVE AND OBJECTIVE BOX
Time of Visit:  Patient seen and examined.     MEDICATIONS  (STANDING):  aspirin  chewable 81 milliGRAM(s) Oral daily  atorvastatin 40 milliGRAM(s) Oral at bedtime  azithromycin  IVPB 500 milliGRAM(s) IV Intermittent every 24 hours  benzonatate 100 milliGRAM(s) Oral three times a day  cefTRIAXone   IVPB 1000 milliGRAM(s) IV Intermittent every 24 hours  enoxaparin Injectable 30 milliGRAM(s) SubCutaneous every 24 hours  montelukast 10 milliGRAM(s) Oral at bedtime  tiotropium 2.5 MICROgram(s) Inhaler 2 Puff(s) Inhalation daily      MEDICATIONS  (PRN):  acetaminophen     Tablet .. 650 milliGRAM(s) Oral every 6 hours PRN Temp greater or equal to 38C (100.4F), Mild Pain (1 - 3)  aluminum hydroxide/magnesium hydroxide/simethicone Suspension 30 milliLiter(s) Oral every 4 hours PRN Dyspepsia  melatonin 3 milliGRAM(s) Oral at bedtime PRN Insomnia  ondansetron Injectable 4 milliGRAM(s) IV Push every 8 hours PRN Nausea and/or Vomiting       Medications up to date at time of exam.    ROS; No fever, chills, cough, nasal congestion on exam. Occasional non productive cough.     PHYSICAL EXAMINATION:  Vital Signs Last 24 Hrs  T(C): 36.8 (28 Feb 2024 12:30), Max: 36.8 (28 Feb 2024 05:05)  T(F): 98.3 (28 Feb 2024 12:30), Max: 98.3 (28 Feb 2024 05:05)  HR: 102 (28 Feb 2024 12:30) (80 - 102)  BP: 117/78 (28 Feb 2024 12:30) (116/73 - 120/62)  BP(mean): --  RR: 16 (28 Feb 2024 12:30) (16 - 19)  SpO2: 94% (28 Feb 2024 12:30) (94% - 96%)    Parameters below as of 28 Feb 2024 12:30  Patient On (Oxygen Delivery Method): room air       (if applicable)    General: Alert and oriented . No acute distress.     HEENT: Head is normocephalic and atraumatic. Extraocular muscles are intact. Mucous membranes are moist.     NECK: Supple, no palpable adenopathy.    LUNGS: Clear to auscultation bilaterally with no wheezing, rales, or rhonchi. No use of accessory muscle.     HEART: S1 S2 Regular rate and no click / rub.     ABDOMEN: Soft, nontender, and nondistended. Active bowel sounds.     EXTREMITIES: Without any cyanosis, clubbing, rash, lesions .    NEUROLOGIC: Awake, alert, oriented.     SKIN: Warm and moist . Non diaphoretic.       LABS:                        11.7   9.36  )-----------( 416      ( 27 Feb 2024 08:57 )             37.5     02-27    137  |  103  |  6<L>  ----------------------------<  83  3.5   |  29  |  0.59    Ca    9.1      27 Feb 2024 08:57        Urinalysis Basic - ( 27 Feb 2024 08:57 )    Color: x / Appearance: x / SG: x / pH: x  Gluc: 83 mg/dL / Ketone: x  / Bili: x / Urobili: x   Blood: x / Protein: x / Nitrite: x   Leuk Esterase: x / RBC: x / WBC x   Sq Epi: x / Non Sq Epi: x / Bacteria: x                      MICROBIOLOGY: (if applicable)    RADIOLOGY & ADDITIONAL STUDIES:  CT Chest : < from: CT Chest No Cont (02.26.24 @ 11:11) >    ACC: [id number] EXAM:  CT CHEST   ORDERED BY: ANNIKA BURRELL     PROCEDURE DATE:  02/26/2024          INTERPRETATION:  HISTORY: Admitting Dxs: J18.9 PNEUMONIA, UNSPECIFIED   ORGANISM    EXAMINATION: CT CHEST was performed without IV contrast.    COMPARISON: 3/14/2017.    FINDINGS:    AIRWAYS, LUNGS, PLEURA: Bronchiectasis with mucus impaction most severely   involving the right middle lobe, lingular, and right lower lobe airways.   Multifocal consolidation, nodular opacities, and tree-in-bud opacities in   the right greater than left lower lobes and to lesser degree in the   lingula. Biapical scarring. No pleural effusion.    MEDIASTINUM: Normal heart size. No pericardial effusion. Thoracic aorta   normal caliber.  No large mediastinal lymph nodes.    IMAGED ABDOMEN: Cholecystectomy.    SOFT TISSUES: Unremarkable.    BONES: Unremarkable.      IMPRESSION:.    Multifocal pneumonia primarily involving the bilateral lower lobes.    CT chest follow-up in 3 months recommended to ensure clearing.    --- End of Report ---             HIRAL SOSA MD; Attending Radiologist  This document has been electronically signed. Feb 26 2024 11:36AM    < end of copied text >     CXR: < from: Xray Chest 1 View-PORTABLE IMMEDIATE (02.24.24 @ 14:06) >    ACC: [id number] EXAM:  XR CHEST PORTABLE IMMED 1V   ORDERED BY: DANNY PRIETO     PROCEDURE DATE:  02/24/2024          INTERPRETATION:  Chest portable upright    CLINICAL HISTORY: Shortness of breath    Comparison:  3/13/2017    Normal heart and mediastinum. Lungs reveal diffuse nodular coarsened lung   markings with scattered calcified granulomata. Areas of tubular   thickening are seen at the lung base greater on the right. Angles sharp.   Bones without acute abnormality.    IMPRESSION: Diffuse nodular interstitial markings have progressed with   areas of mucus impaction/bronchiectasis and calcified granulomata.    Please note patient had CT chest and 2017. Recommend follow-up CT chest   comparison.    --- End of Report ---            PAUL VINES MD; Attending Radiologist  This document has been electronically signed. Feb 25 2024  8:44AM    < end of copied text >    ECHO:    IMPRESSION: 83y Female PAST MEDICAL & SURGICAL HISTORY:  Tachycardia      CAD (coronary artery disease)      HTN (hypertension)      S/P cholecystectomy      Stented coronary artery    Covering Dr Turner for pulmonary .   Impression: This is an 84 Y/O Female presented to the ED with SOB and cough . Urinalysis positive for UTI. For pulmonary follow up on the management of Pneumonia found on CT chest with Bronchiectasis with mucus impaction , Bilateral Multi focal Pneumonia  . 02-24-24 Negative swab for Covid , RVP.     Suggestion:  O2 saturation 96% room air. So far saturating good room air.    On Azithromycin 500 mg IVPB daily. Ceftriaxone 1 Gm IVPB daily till 03-01-24. Going to be on Augmentin 875 mg  Twice Daily till 03-01-24 outpatient.    Continue Singulair 10 mg Oral daily.  Continue Spiriva 2.5 mcg 2 Puffs Daily.  On Benzonatate 100 mg 3x Daily x 3 days.   PFT outpatient.  Repeat CT Chest outpatient  in 6 weeks .  Time of Visit:  Patient seen and examined. covering for Dr Jhonny Viera     MEDICATIONS  (STANDING):  aspirin  chewable 81 milliGRAM(s) Oral daily  atorvastatin 40 milliGRAM(s) Oral at bedtime  azithromycin  IVPB 500 milliGRAM(s) IV Intermittent every 24 hours  benzonatate 100 milliGRAM(s) Oral three times a day  cefTRIAXone   IVPB 1000 milliGRAM(s) IV Intermittent every 24 hours  enoxaparin Injectable 30 milliGRAM(s) SubCutaneous every 24 hours  montelukast 10 milliGRAM(s) Oral at bedtime  tiotropium 2.5 MICROgram(s) Inhaler 2 Puff(s) Inhalation daily      MEDICATIONS  (PRN):  acetaminophen     Tablet .. 650 milliGRAM(s) Oral every 6 hours PRN Temp greater or equal to 38C (100.4F), Mild Pain (1 - 3)  aluminum hydroxide/magnesium hydroxide/simethicone Suspension 30 milliLiter(s) Oral every 4 hours PRN Dyspepsia  melatonin 3 milliGRAM(s) Oral at bedtime PRN Insomnia  ondansetron Injectable 4 milliGRAM(s) IV Push every 8 hours PRN Nausea and/or Vomiting       Medications up to date at time of exam.    ROS; No fever, chills, cough, nasal congestion on exam. Occasional non productive cough.     PHYSICAL EXAMINATION:  Vital Signs Last 24 Hrs  T(C): 36.8 (28 Feb 2024 12:30), Max: 36.8 (28 Feb 2024 05:05)  T(F): 98.3 (28 Feb 2024 12:30), Max: 98.3 (28 Feb 2024 05:05)  HR: 102 (28 Feb 2024 12:30) (80 - 102)  BP: 117/78 (28 Feb 2024 12:30) (116/73 - 120/62)  BP(mean): --  RR: 16 (28 Feb 2024 12:30) (16 - 19)  SpO2: 94% (28 Feb 2024 12:30) (94% - 96%)    Parameters below as of 28 Feb 2024 12:30  Patient On (Oxygen Delivery Method): room air       (if applicable)    General: Alert and oriented . No acute distress.     HEENT: Head is normocephalic and atraumatic. Extraocular muscles are intact. Mucous membranes are moist.     NECK: Supple, no palpable adenopathy.    LUNGS: Clear to auscultation bilaterally with no wheezing, rales, or rhonchi. No use of accessory muscle.     HEART: S1 S2 Regular rate and no click / rub.     ABDOMEN: Soft, nontender, and nondistended. Active bowel sounds.     EXTREMITIES: Without any cyanosis, clubbing, rash, lesions .    NEUROLOGIC: Awake, alert, oriented.     SKIN: Warm and moist . Non diaphoretic.       LABS:                        11.7   9.36  )-----------( 416      ( 27 Feb 2024 08:57 )             37.5     02-27    137  |  103  |  6<L>  ----------------------------<  83  3.5   |  29  |  0.59    Ca    9.1      27 Feb 2024 08:57        Urinalysis Basic - ( 27 Feb 2024 08:57 )    Color: x / Appearance: x / SG: x / pH: x  Gluc: 83 mg/dL / Ketone: x  / Bili: x / Urobili: x   Blood: x / Protein: x / Nitrite: x   Leuk Esterase: x / RBC: x / WBC x   Sq Epi: x / Non Sq Epi: x / Bacteria: x      MICROBIOLOGY: (if applicable)    RADIOLOGY & ADDITIONAL STUDIES:  CT Chest : < from: CT Chest No Cont (02.26.24 @ 11:11) >    ACC: [id number] EXAM:  CT CHEST   ORDERED BY: ANNIKA BURRELL     PROCEDURE DATE:  02/26/2024          INTERPRETATION:  HISTORY: Admitting Dxs: J18.9 PNEUMONIA, UNSPECIFIED   ORGANISM    EXAMINATION: CT CHEST was performed without IV contrast.    COMPARISON: 3/14/2017.    FINDINGS:    AIRWAYS, LUNGS, PLEURA: Bronchiectasis with mucus impaction most severely   involving the right middle lobe, lingular, and right lower lobe airways.   Multifocal consolidation, nodular opacities, and tree-in-bud opacities in   the right greater than left lower lobes and to lesser degree in the   lingula. Biapical scarring. No pleural effusion.    MEDIASTINUM: Normal heart size. No pericardial effusion. Thoracic aorta   normal caliber.  No large mediastinal lymph nodes.    IMAGED ABDOMEN: Cholecystectomy.    SOFT TISSUES: Unremarkable.    BONES: Unremarkable.      IMPRESSION:.    Multifocal pneumonia primarily involving the bilateral lower lobes.    CT chest follow-up in 3 months recommended to ensure clearing.    --- End of Report ---             HIRAL SOSA MD; Attending Radiologist  This document has been electronically signed. Feb 26 2024 11:36AM    < end of copied text >     CXR: < from: Xray Chest 1 View-PORTABLE IMMEDIATE (02.24.24 @ 14:06) >    ACC: [id number] EXAM:  XR CHEST PORTABLE IMMED 1V   ORDERED BY: DANNY PRIETO     PROCEDURE DATE:  02/24/2024          INTERPRETATION:  Chest portable upright    CLINICAL HISTORY: Shortness of breath    Comparison:  3/13/2017    Normal heart and mediastinum. Lungs reveal diffuse nodular coarsened lung   markings with scattered calcified granulomata. Areas of tubular   thickening are seen at the lung base greater on the right. Angles sharp.   Bones without acute abnormality.    IMPRESSION: Diffuse nodular interstitial markings have progressed with   areas of mucus impaction/bronchiectasis and calcified granulomata.    Please note patient had CT chest and 2017. Recommend follow-up CT chest   comparison.    --- End of Report ---            PAUL VINES MD; Attending Radiologist  This document has been electronically signed. Feb 25 2024  8:44AM    < end of copied text >    ECHO:    IMPRESSION: 83y Female PAST MEDICAL & SURGICAL HISTORY:  Tachycardia      CAD (coronary artery disease)      HTN (hypertension)      S/P cholecystectomy      Stented coronary artery    Covering Dr Turner for pulmonary .   Impression: This is an 84 Y/O Female presented to the ED with SOB and cough . Urinalysis positive for UTI. For pulmonary follow up on the management of Pneumonia found on CT chest with Bronchiectasis with mucus impaction , Bilateral Multi focal Pneumonia  . 02-24-24 Negative swab for Covid , RVP.     Suggestion:  O2 saturation 96% room air. So far saturating good room air.    On Azithromycin 500 mg IVPB daily. Ceftriaxone 1 Gm IVPB daily till 03-01-24. Going to be on Augmentin 875 mg  Twice Daily till 03-01-24 outpatient.    Continue Singulair 10 mg Oral daily.  Continue Spiriva 2.5 mcg 2 Puffs Daily.  On Benzonatate 100 mg 3x Daily x 3 days.   PFT outpatient.  Repeat CT Chest outpatient  in 6 weeks .

## 2024-02-28 NOTE — DISCHARGE NOTE PROVIDER - HOSPITAL COURSE
83F from home with PMH HTN and CAD s/p stents presenting to the ED with SOB and cough   CT chest Multifocal pneumonia primarily involving the bilateral lower lobes.  CT chest follow-up in 3 months recommended to ensure clearing.  Urinalysis positive   Admitted for PNA and UTI.   Started on Ceftriaxone and Zithromax   Urine culture growing E coli 83F from home with PMH HTN and CAD s/p stents presenting to the ED with SOB and cough   CT chest Multifocal pneumonia primarily involving the bilateral lower lobes.  CT chest follow-up in 3 months recommended to ensure clearing.  Urinalysis positive   Admitted for PNA and UTI.   Started on Ceftriaxone and Zithromax   Urine culture growing E coli    sputum culture Staphylococcus aureus  As per sensitivity pt to be discharged with recommendations to continue Augmentin until 3/1    83F from home with PMH HTN and CAD s/p stents presenting to the ED with SOB and cough   CT chest Multifocal pneumonia primarily involving the bilateral lower lobes.  CT chest follow-up in 3 months recommended to ensure clearing.  Urinalysis positive   Admitted for PNA and UTI.   Started on Ceftriaxone and Zithromax   Urine culture growing E coli    sputum culture Staphylococcus aureus  As per sensitivity pt to be discharged with recommendations to continue Augmentin until 3/1     Patient would benefit from increased HHA hours

## 2024-02-28 NOTE — DISCHARGE NOTE PROVIDER - CARE PROVIDER_API CALL
Regino Turner  Pulmonary Disease  [address] [place] [id number]  Phone: [phone]  Fax: [phone]  Established Patient  Follow Up Time: 1-3 days   Regino Turner  Pulmonary Disease  [address] [place] [address]  Phone: [phone]  Fax: [phone]  [facility] Patient  Follow Up Time: 1-3 days    Pancho Mina Michael  Internal Medicine  [address] [place] [id number]  Phone: [phone]  Fax: [phone]  Follow Up Time:

## 2024-02-28 NOTE — DISCHARGE NOTE PROVIDER - NSDCMRMEDTOKEN_GEN_ALL_CORE_FT
cephalexin 500 mg oral capsule: 1 cap(s) orally every 12 hours   Cipro 500 mg oral tablet: 1 tab(s) orally every 12 hours  Zithromax 500 mg oral tablet: 1 tab(s) orally once a day   amoxicillin-clavulanate 875 mg-125 mg oral tablet: 1 tab(s) orally 2 times a day

## 2024-02-28 NOTE — DISCHARGE NOTE NURSING/CASE MANAGEMENT/SOCIAL WORK - NSDCPEFALRISK_GEN_ALL_CORE
For information on Fall & Injury Prevention, visit: [email or web address] OR  [email or web address] OR  https://www.cdc.gov/steadi/patient.html

## 2024-02-28 NOTE — PROGRESS NOTE ADULT - REASON FOR ADMISSION
PNA, acute UTI

## 2024-02-28 NOTE — PROGRESS NOTE ADULT - NUTRITIONAL ASSESSMENT
This patient has been assessed with a concern for Malnutrition and has been determined to have a diagnosis/diagnoses of Severe protein-calorie malnutrition and Underweight (BMI < 19).    This patient is being managed with:   Diet Regular-  DASH/TLC {Sodium & Cholesterol Restricted}  Supplement Feeding Modality:  Oral  Ensure Compact Cans or Servings Per Day:  1       Frequency:  Two Times a day  Entered: Feb 26 2024 10:08AM  
This patient has been assessed with a concern for Malnutrition and has been determined to have a diagnosis/diagnoses of Severe protein-calorie malnutrition and Underweight (BMI < 19).    This patient is being managed with:   Diet Regular-  DASH/TLC {Sodium & Cholesterol Restricted}  Supplement Feeding Modality:  Oral  Ensure Compact Cans or Servings Per Day:  1       Frequency:  Two Times a day  Entered: Feb 26 2024 10:08AM    Diet Regular-  Entered: Feb 24 2024  6:31PM    The following pending diet order is being considered for treatment of Severe protein-calorie malnutrition and Underweight (BMI < 19):null
This patient has been assessed with a concern for Malnutrition and has been determined to have a diagnosis/diagnoses of Severe protein-calorie malnutrition and Underweight (BMI < 19).    This patient is being managed with:   Diet Regular-  DASH/TLC {Sodium & Cholesterol Restricted}  Supplement Feeding Modality:  Oral  Ensure Compact Cans or Servings Per Day:  1       Frequency:  Two Times a day  Entered: Feb 26 2024 10:08AM  
This patient has been assessed with a concern for Malnutrition and has been determined to have a diagnosis/diagnoses of Severe protein-calorie malnutrition and Underweight (BMI < 19).    This patient is being managed with:   Diet Regular-  DASH/TLC {Sodium & Cholesterol Restricted}  Supplement Feeding Modality:  Oral  Ensure Compact Cans or Servings Per Day:  1       Frequency:  Two Times a day  Entered: Feb 26 2024 10:08AM    Diet Regular-  Entered: Feb 24 2024  6:31PM    The following pending diet order is being considered for treatment of Severe protein-calorie malnutrition and Underweight (BMI < 19):null

## 2025-06-03 NOTE — PATIENT PROFILE ADULT - NSPROPOAPRESSUREINJURY_GEN_A_NUR
Patient return call and said she instructed to take this medication only as needed. She has been taking them as needed and doesn't need a refill until now.     Preferred pharmacy: [facility] #[id number] - [facility], [place] - [address]    no

## 2025-07-21 NOTE — ED ADULT NURSE NOTE - NS ED NOTE ABUSE SUSPICION NEGLECT YN
ED TO INPATIENT SBAR HANDOFF    Patient Name: Alyssa Love   : 1935  89 y.o.   Family/Caregiver Present: Yes  Code Status Order: Full Code    C-SSRS: Risk of Suicide: No Risk  Sitter No  Restraints:         Situation  Chief Complaint:   Chief Complaint   Patient presents with    Nausea     Pt started having nausea last night.     Patient Diagnosis: Cholecystitis [K81.9]     Brief Description of Patient's Condition:   Pt presented to ER earlier this morning, woke up felt like she was freezing. Pt had an 4 episodes of emesis with abdominal pain that resolved after emesis. PT lactic elevated. They are thinking possibly from gallbladder. Pt given sepsis bolus and  flagyl and rocephin. Initial temperature was 102.8, treated with tylenol, now 99.5 Pt is awake alert and oriented.   Mental Status: oriented, alert, coherent, logical, thought processes intact, and able to concentrate and follow conversation  Arrived from: home    Imaging:   CT ABDOMEN PELVIS W IV CONTRAST Additional Contrast? None   Final Result   1. Lobular contour of the liver likely due to chronic liver disease.   2. Gallbladder wall thickening and/or pericholecystic edema. Cholelithiasis. Ultrasound of the gallbladder could be considered for further evaluation. Cholecystitis is not excluded.   3. No urinary or bowel obstruction and normal appendix.        All CT scans are performed using dose optimization techniques as appropriate to the performed exam and include    at least one of the following: Automated exposure control, adjustment of the mA and/or kV according to size, and the use of iterative reconstruction technique.        ______________________________________    Electronically signed by: ANAMARIA BOGGS M.D.   Date:     2025   Time:    05:38       XR CHEST PORTABLE   Final Result       1.  No acute findings in the chest.               ______________________________________    Electronically signed by: TERI LEUNG M.D.   Date:     
No